# Patient Record
Sex: FEMALE | Race: WHITE
[De-identification: names, ages, dates, MRNs, and addresses within clinical notes are randomized per-mention and may not be internally consistent; named-entity substitution may affect disease eponyms.]

---

## 2023-07-24 ENCOUNTER — HOSPITAL ENCOUNTER (OUTPATIENT)
Dept: HOSPITAL 101 - LAB | Age: 68
Discharge: HOME | End: 2023-07-24
Payer: MEDICARE

## 2023-07-24 DIAGNOSIS — E11.9: ICD-10-CM

## 2023-07-24 DIAGNOSIS — I50.40: ICD-10-CM

## 2023-07-24 DIAGNOSIS — E78.00: ICD-10-CM

## 2023-07-24 DIAGNOSIS — Z79.899: Primary | ICD-10-CM

## 2023-07-24 LAB
ADD MANUAL DIFF: NO
ALANINE AMINOTRANSFERASE: 27 U/L (ref 14–59)
ALBUMIN GLOBULIN RATIO: 1.3
ALBUMIN LEVEL: 3.9 G/DL (ref 3.4–5)
ALKALINE PHOSPHATASE: 106 U/L (ref 46–116)
ANION GAP: 16.1
ASPARTATE AMINO TRANSFERASE: 19 U/L (ref 15–37)
BLOOD UREA NITROGEN: 13 MG/DL (ref 7–18)
CARBON DIOXIDE: 24.3 MMOL/L (ref 21–32)
CHLORIDE: 106 MMOL/L (ref 98–107)
CHOLESTEROL: 120 MG/DL (ref ?–200)
CO2 BLD-SCNC: 24.3 MMOL/L (ref 21–32)
ESTIMATED GFR (AFRICAN AMERICA: >60 (ref 60–?)
ESTIMATED GFR (NON-AFRICAN AME: 51 (ref 60–?)
GLOBULIN: 2.9 G/DL
GLUCOSE URINE UA: NEGATIVE MG/DL
HCT VFR BLD CALC: 40.9 % (ref 36–48)
HDL CHOLESTEROL: 63 MG/DL (ref 40–60)
HEMATOCRIT: 40.9 % (ref 36–48)
HEMOGLOBIN: 13 G/DL (ref 12–16)
IMMATURE GRANULOCYTES ABS AUTO: 0.03 10^3/UL (ref 0–0.03)
IMMATURE GRANULOCYTES PCT AUTO: 0.4 % (ref 0–0.5)
LYMPHOCYTES  ABSOLUTE AUTO: 1 10^3/UL (ref 1.2–3.8)
MCV RBC: 93.6 FL (ref 81–99)
MEAN CORPUSCULAR HEMOGLOBIN: 29.7 PG (ref 26.7–34)
MEAN CORPUSCULAR HGB CONC: 31.8 G/DL (ref 29.9–35.2)
MEAN CORPUSCULAR VOLUME: 93.6 FL (ref 81–99)
NT PRO B TYPE NATRIURETIC PEPT: 51 PG/ML (ref ?–900)
PLATELET # BLD: 252 10^3/UL (ref 150–450)
PLATELET COUNT: 252 10^3/UL (ref 150–450)
POTASSIUM SERPLBLD-SCNC: 4.4 MMOL/L (ref 3.5–5.1)
POTASSIUM: 4.4 MMOL/L (ref 3.5–5.1)
RED BLOOD COUNT: 4.37 10^6/UL (ref 4.2–5.4)
SODIUM BLD-SCNC: 142 MMOL/L (ref 136–145)
SODIUM: 142 MMOL/L (ref 136–145)
THYROID STIMULATING HORMONE: 1.78 UIU/ML (ref 0.36–3.74)
TOTAL PROTEIN: 6.8 G/DL (ref 6.4–8.2)
TRIGLYCERIDES: 119 MG/DL (ref ?–150)
URINE CULTURE INDICATED: YES
VLDL CHOLESTEROL: 23.8 MG/DL
WBC # BLD: 7.1 10^3/UL (ref 4–11)
WHITE BLOOD COUNT: 7.1 10^3/UL (ref 4–11)

## 2023-07-24 PROCEDURE — 83036 HEMOGLOBIN GLYCOSYLATED A1C: CPT

## 2023-07-24 PROCEDURE — 81015 MICROSCOPIC EXAM OF URINE: CPT

## 2023-07-24 PROCEDURE — 83880 ASSAY OF NATRIURETIC PEPTIDE: CPT

## 2023-07-24 PROCEDURE — 80051 ELECTROLYTE PANEL: CPT

## 2023-07-24 PROCEDURE — 80076 HEPATIC FUNCTION PANEL: CPT

## 2023-07-24 PROCEDURE — 85025 COMPLETE CBC W/AUTO DIFF WBC: CPT

## 2023-07-24 PROCEDURE — 82565 ASSAY OF CREATININE: CPT

## 2023-07-24 PROCEDURE — 81003 URINALYSIS AUTO W/O SCOPE: CPT

## 2023-07-24 PROCEDURE — 87086 URINE CULTURE/COLONY COUNT: CPT

## 2023-07-24 PROCEDURE — 80061 LIPID PANEL: CPT

## 2023-07-24 PROCEDURE — 84443 ASSAY THYROID STIM HORMONE: CPT

## 2023-07-24 PROCEDURE — 36415 COLL VENOUS BLD VENIPUNCTURE: CPT

## 2023-07-24 PROCEDURE — 84520 ASSAY OF UREA NITROGEN: CPT

## 2023-08-07 ENCOUNTER — HOSPITAL ENCOUNTER (OUTPATIENT)
Dept: HOSPITAL 101 - LAB | Age: 68
Discharge: HOME | End: 2023-08-07
Payer: MEDICARE

## 2023-08-07 DIAGNOSIS — F32.A: ICD-10-CM

## 2023-08-07 DIAGNOSIS — E11.9: Primary | ICD-10-CM

## 2023-08-07 DIAGNOSIS — J45.909: ICD-10-CM

## 2023-08-07 DIAGNOSIS — I10: ICD-10-CM

## 2023-08-07 LAB
ADD MANUAL DIFF: NO
ALANINE AMINOTRANSFERASE: 26 U/L (ref 14–59)
ALBUMIN GLOBULIN RATIO: 1.3
ALBUMIN LEVEL: 4 G/DL (ref 3.4–5)
ALKALINE PHOSPHATASE: 117 U/L (ref 46–116)
ANION GAP: 14
ASPARTATE AMINO TRANSFERASE: 13 U/L (ref 15–37)
BLOOD UREA NITROGEN: 13 MG/DL (ref 7–18)
CARBON DIOXIDE: 27.9 MMOL/L (ref 21–32)
CHLORIDE: 102 MMOL/L (ref 98–107)
CHOLESTEROL: 123 MG/DL (ref ?–200)
CO2 BLD-SCNC: 27.9 MMOL/L (ref 21–32)
ESTIMATED GFR (AFRICAN AMERICA: >60 (ref 60–?)
ESTIMATED GFR (NON-AFRICAN AME: >60 (ref 60–?)
GLOBULIN: 3.2 G/DL
GLUCOSE BLD-MCNC: 136 MG/DL (ref 74–106)
HCT VFR BLD CALC: 42.5 % (ref 36–48)
HDL CHOLESTEROL: 66 MG/DL (ref 40–60)
HEMATOCRIT: 42.5 % (ref 36–48)
HEMOGLOBIN: 13.5 G/DL (ref 12–16)
IMMATURE GRANULOCYTES ABS AUTO: 0.05 10^3/UL (ref 0–0.03)
IMMATURE GRANULOCYTES PCT AUTO: 0.4 % (ref 0–0.5)
LYMPHOCYTES  ABSOLUTE AUTO: 1.1 10^3/UL (ref 1.2–3.8)
MCV RBC: 94.2 FL (ref 81–99)
MEAN CORPUSCULAR HEMOGLOBIN: 29.9 PG (ref 26.7–34)
MEAN CORPUSCULAR HGB CONC: 31.8 G/DL (ref 29.9–35.2)
MEAN CORPUSCULAR VOLUME: 94.2 FL (ref 81–99)
NT PRO B TYPE NATRIURETIC PEPT: 39 PG/ML (ref ?–900)
PLATELET # BLD: 236 10^3/UL (ref 150–450)
PLATELET COUNT: 236 10^3/UL (ref 150–450)
POTASSIUM SERPLBLD-SCNC: 3.9 MMOL/L (ref 3.5–5.1)
POTASSIUM: 3.9 MMOL/L (ref 3.5–5.1)
RED BLOOD COUNT: 4.51 10^6/UL (ref 4.2–5.4)
SODIUM BLD-SCNC: 140 MMOL/L (ref 136–145)
SODIUM: 140 MMOL/L (ref 136–145)
THYROID STIMULATING HORMONE: 1.49 UIU/ML (ref 0.36–3.74)
TOTAL PROTEIN: 7.2 G/DL (ref 6.4–8.2)
TRIGLYCERIDES: 101 MG/DL (ref ?–150)
VLDL CHOLESTEROL: 20.2 MG/DL
WBC # BLD: 11.2 10^3/UL (ref 4–11)
WHITE BLOOD COUNT: 11.2 10^3/UL (ref 4–11)

## 2023-08-07 PROCEDURE — 80076 HEPATIC FUNCTION PANEL: CPT

## 2023-08-07 PROCEDURE — 83036 HEMOGLOBIN GLYCOSYLATED A1C: CPT

## 2023-08-07 PROCEDURE — 80051 ELECTROLYTE PANEL: CPT

## 2023-08-07 PROCEDURE — 82947 ASSAY GLUCOSE BLOOD QUANT: CPT

## 2023-08-07 PROCEDURE — 82565 ASSAY OF CREATININE: CPT

## 2023-08-07 PROCEDURE — 84443 ASSAY THYROID STIM HORMONE: CPT

## 2023-08-07 PROCEDURE — 84520 ASSAY OF UREA NITROGEN: CPT

## 2023-08-07 PROCEDURE — 36415 COLL VENOUS BLD VENIPUNCTURE: CPT

## 2023-08-07 PROCEDURE — 82306 VITAMIN D 25 HYDROXY: CPT

## 2023-08-07 PROCEDURE — 85025 COMPLETE CBC W/AUTO DIFF WBC: CPT

## 2023-08-07 PROCEDURE — 80061 LIPID PANEL: CPT

## 2023-08-07 PROCEDURE — 83880 ASSAY OF NATRIURETIC PEPTIDE: CPT

## 2023-10-23 ENCOUNTER — HOSPITAL ENCOUNTER (OUTPATIENT)
Dept: HOSPITAL 101 - LAB | Age: 68
Discharge: HOME | End: 2023-10-23
Payer: MEDICARE

## 2023-10-23 DIAGNOSIS — E11.9: Primary | ICD-10-CM

## 2023-10-23 LAB
ALANINE AMINOTRANSFERASE: 27 U/L (ref 14–59)
ALBUMIN GLOBULIN RATIO: 1.2
ALBUMIN LEVEL: 3.9 G/DL (ref 3.4–5)
ALKALINE PHOSPHATASE: 125 U/L (ref 46–116)
ANION GAP: 11.9
ASPARTATE AMINO TRANSFERASE: 15 U/L (ref 15–37)
BLOOD UREA NITROGEN: 15 MG/DL (ref 7–18)
CALCIUM: 9.1 MG/DL (ref 8.5–10.1)
CARBON DIOXIDE: 28.9 MMOL/L (ref 21–32)
CHLORIDE: 105 MMOL/L (ref 98–107)
CHOLESTEROL: 129 MG/DL (ref ?–200)
CO2 BLD-SCNC: 28.9 MMOL/L (ref 21–32)
ESTIMATED GFR (AFRICAN AMERICA: >60 (ref 60–?)
ESTIMATED GFR (NON-AFRICAN AME: 59 (ref 60–?)
GLOBULIN: 3.2 G/DL
GLUCOSE BLD-MCNC: 90 MG/DL (ref 74–106)
HDL CHOLESTEROL: 71 MG/DL (ref 40–60)
POTASSIUM SERPLBLD-SCNC: 4.8 MMOL/L (ref 3.5–5.1)
POTASSIUM: 4.8 MMOL/L (ref 3.5–5.1)
SODIUM BLD-SCNC: 141 MMOL/L (ref 136–145)
SODIUM: 141 MMOL/L (ref 136–145)
TOTAL PROTEIN: 7.1 G/DL (ref 6.4–8.2)
TRIGLYCERIDES: 85 MG/DL (ref ?–150)
VLDL CHOLESTEROL: 17 MG/DL

## 2023-10-23 PROCEDURE — 80061 LIPID PANEL: CPT

## 2023-10-23 PROCEDURE — 83036 HEMOGLOBIN GLYCOSYLATED A1C: CPT

## 2023-10-23 PROCEDURE — 80048 BASIC METABOLIC PNL TOTAL CA: CPT

## 2023-10-23 PROCEDURE — 80076 HEPATIC FUNCTION PANEL: CPT

## 2023-10-23 PROCEDURE — 36415 COLL VENOUS BLD VENIPUNCTURE: CPT

## 2023-11-08 ENCOUNTER — HOSPITAL ENCOUNTER
Dept: HOSPITAL 101 - MAMMO | Age: 68
Discharge: HOME | End: 2023-11-08
Payer: MEDICARE

## 2023-11-08 DIAGNOSIS — Z12.31: Primary | ICD-10-CM

## 2023-11-08 PROCEDURE — 77067 SCR MAMMO BI INCL CAD: CPT

## 2023-11-08 PROCEDURE — 77063 BREAST TOMOSYNTHESIS BI: CPT

## 2023-11-15 ENCOUNTER — HOSPITAL ENCOUNTER (OUTPATIENT)
Dept: HOSPITAL 101 - LAB | Age: 68
Discharge: HOME | End: 2023-11-15
Payer: MEDICARE

## 2023-11-15 DIAGNOSIS — Z01.419: Primary | ICD-10-CM

## 2023-11-15 PROCEDURE — G0145 SCR C/V CYTO,THINLAYER,RESCR: HCPCS

## 2023-11-27 ENCOUNTER — HOSPITAL ENCOUNTER
Dept: HOSPITAL 101 - LAB | Age: 68
Discharge: HOME | End: 2023-11-27
Payer: MEDICARE

## 2023-11-27 DIAGNOSIS — K57.90: ICD-10-CM

## 2023-11-27 DIAGNOSIS — K62.5: Primary | ICD-10-CM

## 2023-11-27 LAB
ESTIMATED GFR (AFRICAN AMERICA: 57 (ref 60–?)
ESTIMATED GFR (NON-AFRICAN AME: 47 (ref 60–?)

## 2023-11-27 PROCEDURE — 82565 ASSAY OF CREATININE: CPT

## 2023-11-27 PROCEDURE — 36415 COLL VENOUS BLD VENIPUNCTURE: CPT

## 2023-11-27 PROCEDURE — 74178 CT ABD&PLV WO CNTR FLWD CNTR: CPT

## 2023-11-30 ENCOUNTER — HOSPITAL ENCOUNTER
Dept: HOSPITAL 101 - US | Age: 68
Discharge: HOME | End: 2023-11-30
Payer: MEDICARE

## 2023-11-30 DIAGNOSIS — Q51.28: ICD-10-CM

## 2023-11-30 DIAGNOSIS — N85.00: ICD-10-CM

## 2023-11-30 DIAGNOSIS — N95.0: Primary | ICD-10-CM

## 2023-11-30 PROCEDURE — 76830 TRANSVAGINAL US NON-OB: CPT

## 2023-11-30 PROCEDURE — 76856 US EXAM PELVIC COMPLETE: CPT

## 2023-12-26 ENCOUNTER — HOSPITAL ENCOUNTER (OUTPATIENT)
Dept: HOSPITAL 101 - LAB | Age: 68
Discharge: HOME | End: 2023-12-26
Payer: MEDICARE

## 2023-12-26 DIAGNOSIS — R87.612: Primary | ICD-10-CM

## 2023-12-26 PROCEDURE — 88342 IMHCHEM/IMCYTCHM 1ST ANTB: CPT

## 2023-12-26 PROCEDURE — 88305 TISSUE EXAM BY PATHOLOGIST: CPT

## 2023-12-26 PROCEDURE — 88341 IMHCHEM/IMCYTCHM EA ADD ANTB: CPT

## 2024-02-05 ENCOUNTER — HOSPITAL ENCOUNTER
Dept: HOSPITAL 101 - PST | Age: 69
Discharge: HOME | End: 2024-02-05
Payer: MEDICARE

## 2024-02-05 DIAGNOSIS — Z01.812: Primary | ICD-10-CM

## 2024-02-05 DIAGNOSIS — R93.89: ICD-10-CM

## 2024-02-05 DIAGNOSIS — J44.9: ICD-10-CM

## 2024-02-05 DIAGNOSIS — R87.613: ICD-10-CM

## 2024-02-05 DIAGNOSIS — Z01.810: ICD-10-CM

## 2024-02-05 DIAGNOSIS — N95.0: ICD-10-CM

## 2024-02-05 DIAGNOSIS — I10: ICD-10-CM

## 2024-02-05 DIAGNOSIS — Z79.01: ICD-10-CM

## 2024-02-05 LAB
ADD MANUAL DIFF: NO
ANION GAP: 13.9
BLOOD UREA NITROGEN: 18 MG/DL (ref 7–18)
CALCIUM: 9.1 MG/DL (ref 8.5–10.1)
CARBON DIOXIDE: 26.5 MMOL/L (ref 21–32)
CHLORIDE: 106 MMOL/L (ref 98–107)
CO2 BLD-SCNC: 26.5 MMOL/L (ref 21–32)
ESTIMATED GFR (AFRICAN AMERICA: >60 (ref 60–?)
ESTIMATED GFR (NON-AFRICAN AME: 50 (ref 60–?)
GLUCOSE BLD-MCNC: 92 MG/DL (ref 74–106)
HCT VFR BLD CALC: 34.1 % (ref 36–48)
HEMATOCRIT: 34.1 % (ref 36–48)
HEMOGLOBIN: 10.7 G/DL (ref 12–16)
IMMATURE GRANULOCYTES ABS AUTO: 0.03 10^3/UL (ref 0–0.03)
IMMATURE GRANULOCYTES PCT AUTO: 0.3 % (ref 0–0.5)
INR: 0.95
LYMPHOCYTES  ABSOLUTE AUTO: 1.4 10^3/UL (ref 1.2–3.8)
MCV RBC: 94.7 FL (ref 81–99)
MEAN CORPUSCULAR HEMOGLOBIN: 29.7 PG (ref 26.7–34)
MEAN CORPUSCULAR HGB CONC: 31.4 G/DL (ref 29.9–35.2)
MEAN CORPUSCULAR VOLUME: 94.7 FL (ref 81–99)
PARTIAL THROMBOPLASTIN TIME: 27.5 SEC (ref 22.3–36.2)
PLATELET # BLD: 385 10^3/UL (ref 150–450)
PLATELET COUNT: 385 10^3/UL (ref 150–450)
POTASSIUM SERPLBLD-SCNC: 4.4 MMOL/L (ref 3.5–5.1)
POTASSIUM: 4.4 MMOL/L (ref 3.5–5.1)
PROTHROMBIN TIME: 10.1 SEC (ref 9–11.6)
RED BLOOD COUNT: 3.6 10^6/UL (ref 4.2–5.4)
SODIUM BLD-SCNC: 142 MMOL/L (ref 136–145)
SODIUM: 142 MMOL/L (ref 136–145)
WBC # BLD: 9 10^3/UL (ref 4–11)
WHITE BLOOD COUNT: 9 10^3/UL (ref 4–11)

## 2024-02-05 PROCEDURE — 85610 PROTHROMBIN TIME: CPT

## 2024-02-05 PROCEDURE — 85730 THROMBOPLASTIN TIME PARTIAL: CPT

## 2024-02-05 PROCEDURE — 93005 ELECTROCARDIOGRAM TRACING: CPT

## 2024-02-05 PROCEDURE — 85025 COMPLETE CBC W/AUTO DIFF WBC: CPT

## 2024-02-05 PROCEDURE — 71046 X-RAY EXAM CHEST 2 VIEWS: CPT

## 2024-02-05 PROCEDURE — 80048 BASIC METABOLIC PNL TOTAL CA: CPT

## 2024-02-07 ENCOUNTER — HOSPITAL ENCOUNTER (OUTPATIENT)
Dept: HOSPITAL 101 - LAB | Age: 69
Discharge: HOME | End: 2024-02-07
Payer: MEDICARE

## 2024-02-07 DIAGNOSIS — E11.8: Primary | ICD-10-CM

## 2024-02-07 DIAGNOSIS — E55.9: ICD-10-CM

## 2024-02-07 DIAGNOSIS — R06.02: ICD-10-CM

## 2024-02-07 LAB
ADD MANUAL DIFF: NO
ANION GAP: 13.3
BLOOD UREA NITROGEN: 17 MG/DL (ref 7–18)
CALCIUM: 9.3 MG/DL (ref 8.5–10.1)
CARBON DIOXIDE: 27.2 MMOL/L (ref 21–32)
CHLORIDE: 104 MMOL/L (ref 98–107)
CHOLESTEROL: 152 MG/DL (ref ?–200)
CO2 BLD-SCNC: 27.2 MMOL/L (ref 21–32)
ESTIMATED GFR (AFRICAN AMERICA: 59 (ref 60–?)
ESTIMATED GFR (NON-AFRICAN AME: 49 (ref 60–?)
HCT VFR BLD CALC: 37.1 % (ref 36–48)
HDL CHOLESTEROL: 69 MG/DL (ref 40–60)
HEMATOCRIT: 37.1 % (ref 36–48)
HEMOGLOBIN: 11.4 G/DL (ref 12–16)
IMMATURE GRANULOCYTES ABS AUTO: 0.03 10^3/UL (ref 0–0.03)
IMMATURE GRANULOCYTES PCT AUTO: 0.4 % (ref 0–0.5)
LYMPHOCYTES  ABSOLUTE AUTO: 1.3 10^3/UL (ref 1.2–3.8)
MCV RBC: 96.9 FL (ref 81–99)
MEAN CORPUSCULAR HEMOGLOBIN: 29.8 PG (ref 26.7–34)
MEAN CORPUSCULAR HGB CONC: 30.7 G/DL (ref 29.9–35.2)
MEAN CORPUSCULAR VOLUME: 96.9 FL (ref 81–99)
NT PRO B TYPE NATRIURETIC PEPT: 63 PG/ML (ref ?–900)
PLATELET # BLD: 392 10^3/UL (ref 150–450)
PLATELET COUNT: 392 10^3/UL (ref 150–450)
POTASSIUM SERPLBLD-SCNC: 4.5 MMOL/L (ref 3.5–5.1)
POTASSIUM: 4.5 MMOL/L (ref 3.5–5.1)
RED BLOOD COUNT: 3.83 10^6/UL (ref 4.2–5.4)
SODIUM BLD-SCNC: 140 MMOL/L (ref 136–145)
SODIUM: 140 MMOL/L (ref 136–145)
THYROID STIMULATING HORMONE: 1.79 UIU/ML (ref 0.36–3.74)
TRIGLYCERIDES: 120 MG/DL (ref ?–150)
VLDL CHOLESTEROL: 24 MG/DL
WBC # BLD: 7.8 10^3/UL (ref 4–11)
WHITE BLOOD COUNT: 7.8 10^3/UL (ref 4–11)

## 2024-02-07 PROCEDURE — 85025 COMPLETE CBC W/AUTO DIFF WBC: CPT

## 2024-02-07 PROCEDURE — 80061 LIPID PANEL: CPT

## 2024-02-07 PROCEDURE — 84520 ASSAY OF UREA NITROGEN: CPT

## 2024-02-07 PROCEDURE — 82306 VITAMIN D 25 HYDROXY: CPT

## 2024-02-07 PROCEDURE — 80051 ELECTROLYTE PANEL: CPT

## 2024-02-07 PROCEDURE — 82310 ASSAY OF CALCIUM: CPT

## 2024-02-07 PROCEDURE — 82565 ASSAY OF CREATININE: CPT

## 2024-02-07 PROCEDURE — 36415 COLL VENOUS BLD VENIPUNCTURE: CPT

## 2024-02-07 PROCEDURE — 84443 ASSAY THYROID STIM HORMONE: CPT

## 2024-02-07 PROCEDURE — 83036 HEMOGLOBIN GLYCOSYLATED A1C: CPT

## 2024-02-07 PROCEDURE — 83880 ASSAY OF NATRIURETIC PEPTIDE: CPT

## 2024-02-07 PROCEDURE — 85652 RBC SED RATE AUTOMATED: CPT

## 2024-03-01 ENCOUNTER — HOSPITAL ENCOUNTER
Dept: HOSPITAL 101 - NM | Age: 69
Discharge: HOME | End: 2024-03-01
Payer: MEDICARE

## 2024-03-01 DIAGNOSIS — Z01.810: Primary | ICD-10-CM

## 2024-03-01 PROCEDURE — A9500 TC99M SESTAMIBI: HCPCS

## 2024-03-01 PROCEDURE — 78452 HT MUSCLE IMAGE SPECT MULT: CPT

## 2024-03-01 PROCEDURE — 93306 TTE W/DOPPLER COMPLETE: CPT

## 2024-03-01 PROCEDURE — 93017 CV STRESS TEST TRACING ONLY: CPT

## 2024-03-01 RX ADMIN — REGADENOSON 0.4 MG: 0.08 INJECTION, SOLUTION INTRAVENOUS at 09:49

## 2024-03-15 ENCOUNTER — HOSPITAL ENCOUNTER (OUTPATIENT)
Dept: HOSPITAL 101 - LAB | Age: 69
Discharge: HOME | End: 2024-03-15
Payer: MEDICARE

## 2024-03-15 DIAGNOSIS — D64.9: Primary | ICD-10-CM

## 2024-03-15 LAB
ADD MANUAL DIFF: NO
ALANINE AMINOTRANSFERASE: 25 U/L (ref 14–59)
ALBUMIN GLOBULIN RATIO: 1.2
ALBUMIN LEVEL: 3.7 G/DL (ref 3.4–5)
ALKALINE PHOSPHATASE: 120 U/L (ref 46–116)
ANION GAP: 15.6
ASPARTATE AMINO TRANSFERASE: 16 U/L (ref 15–37)
BLOOD UREA NITROGEN: 12 MG/DL (ref 7–18)
CALCIUM: 9 MG/DL (ref 8.5–10.1)
CARBON DIOXIDE: 24.6 MMOL/L (ref 21–32)
CHLORIDE: 102 MMOL/L (ref 98–107)
ESTIMATED GFR (AFRICAN AMERICA: 44 (ref 60–?)
ESTIMATED GFR (NON-AFRICAN AME: 36 (ref 60–?)
GLOBULIN: 3.2 G/DL
GLUCOSE: 124 MG/DL (ref 74–106)
HEMATOCRIT: 30.2 % (ref 36–48)
HEMOGLOBIN: 9 G/DL (ref 12–16)
IMMATURE GRANULOCYTES ABS AUTO: 0.04 10^3/UL (ref 0–0.03)
IMMATURE GRANULOCYTES PCT AUTO: 0.4 % (ref 0–0.5)
LYMPHOCYTES  ABSOLUTE AUTO: 1.4 10^3/UL (ref 1.2–3.8)
MCV RBC: 100 FL (ref 81–99)
MEAN CORPUSCULAR HEMOGLOBIN: 29.8 PG (ref 26.7–34)
MEAN CORPUSCULAR HGB CONC: 29.8 G/DL (ref 29.9–35.2)
PLATELET COUNT: 395 10^3/UL (ref 150–450)
POTASSIUM: 4.2 MMOL/L (ref 3.5–5.1)
RED BLOOD COUNT: 3.02 10^6/UL (ref 4.2–5.4)
SODIUM: 138 MMOL/L (ref 136–145)
TOTAL PROTEIN: 6.9 G/DL (ref 6.4–8.2)
WHITE BLOOD COUNT: 10.8 10^3/UL (ref 4–11)

## 2024-03-15 PROCEDURE — 85025 COMPLETE CBC W/AUTO DIFF WBC: CPT

## 2024-03-15 PROCEDURE — 80053 COMPREHEN METABOLIC PANEL: CPT

## 2024-03-15 PROCEDURE — 36415 COLL VENOUS BLD VENIPUNCTURE: CPT

## 2024-03-22 ENCOUNTER — HOSPITAL ENCOUNTER (OUTPATIENT)
Dept: HOSPITAL 101 - LAB | Age: 69
Discharge: HOME | End: 2024-03-22
Payer: MEDICARE

## 2024-03-22 DIAGNOSIS — I10: Primary | ICD-10-CM

## 2024-03-22 LAB
ANION GAP: 17.1
BLOOD UREA NITROGEN: 16 MG/DL (ref 7–18)
CALCIUM: 9.3 MG/DL (ref 8.5–10.1)
CARBON DIOXIDE: 26.5 MMOL/L (ref 21–32)
CHLORIDE: 98 MMOL/L (ref 98–107)
ESTIMATED GFR (AFRICAN AMERICA: 36 (ref 60–?)
ESTIMATED GFR (NON-AFRICAN AME: 30 (ref 60–?)
GLUCOSE: 97 MG/DL (ref 74–106)
POTASSIUM: 4.6 MMOL/L (ref 3.5–5.1)
SODIUM: 137 MMOL/L (ref 136–145)

## 2024-03-22 PROCEDURE — 80048 BASIC METABOLIC PNL TOTAL CA: CPT

## 2024-03-22 PROCEDURE — 36415 COLL VENOUS BLD VENIPUNCTURE: CPT

## 2024-04-05 ENCOUNTER — HOSPITAL ENCOUNTER (OUTPATIENT)
Dept: HOSPITAL 101 - LAB | Age: 69
Discharge: HOME | End: 2024-04-05
Payer: MEDICARE

## 2024-04-05 DIAGNOSIS — R94.4: Primary | ICD-10-CM

## 2024-04-05 DIAGNOSIS — I10: ICD-10-CM

## 2024-04-05 LAB
ANION GAP: 15.1
BLOOD UREA NITROGEN: 10 MG/DL (ref 7–18)
CALCIUM: 8.9 MG/DL (ref 8.5–10.1)
CARBON DIOXIDE: 23 MMOL/L (ref 21–32)
CHLORIDE: 104 MMOL/L (ref 98–107)
ESTIMATED GFR (AFRICAN AMERICA: 45 (ref 60–?)
ESTIMATED GFR (NON-AFRICAN AME: 37 (ref 60–?)
GLUCOSE: 143 MG/DL (ref 74–106)
POTASSIUM: 4.1 MMOL/L (ref 3.5–5.1)
SODIUM: 138 MMOL/L (ref 136–145)

## 2024-04-05 PROCEDURE — 36415 COLL VENOUS BLD VENIPUNCTURE: CPT

## 2024-04-05 PROCEDURE — 80048 BASIC METABOLIC PNL TOTAL CA: CPT

## 2024-04-26 ENCOUNTER — HOSPITAL ENCOUNTER (OUTPATIENT)
Dept: HOSPITAL 101 - LAB | Age: 69
Discharge: HOME | End: 2024-04-26
Payer: MEDICARE

## 2024-04-26 ENCOUNTER — HOSPITAL ENCOUNTER
Age: 69
Discharge: HOME | End: 2024-04-26
Payer: MEDICARE

## 2024-04-26 DIAGNOSIS — R06.02: ICD-10-CM

## 2024-04-26 DIAGNOSIS — Z01.818: Primary | ICD-10-CM

## 2024-04-26 DIAGNOSIS — Z79.01: ICD-10-CM

## 2024-04-26 DIAGNOSIS — R93.89: ICD-10-CM

## 2024-04-26 DIAGNOSIS — J44.9: ICD-10-CM

## 2024-04-26 DIAGNOSIS — I10: ICD-10-CM

## 2024-04-26 DIAGNOSIS — R87.613: ICD-10-CM

## 2024-04-26 DIAGNOSIS — Z01.812: Primary | ICD-10-CM

## 2024-04-26 DIAGNOSIS — E11.9: ICD-10-CM

## 2024-04-26 DIAGNOSIS — R06.09: ICD-10-CM

## 2024-04-26 DIAGNOSIS — K76.9: ICD-10-CM

## 2024-04-26 LAB
ADD MANUAL DIFF: NO
ALANINE AMINOTRANSFERASE: 21 U/L (ref 14–59)
ALBUMIN GLOBULIN RATIO: 1.1
ALBUMIN LEVEL: 3.4 G/DL (ref 3.4–5)
ALKALINE PHOSPHATASE: 103 U/L (ref 46–116)
ANION GAP: 17.6
ASPARTATE AMINO TRANSFERASE: 16 U/L (ref 15–37)
BLOOD UREA NITROGEN: 13 MG/DL (ref 7–18)
CALCIUM: 9.5 MG/DL (ref 8.5–10.1)
CARBON DIOXIDE: 23 MMOL/L (ref 21–32)
CHLORIDE: 101 MMOL/L (ref 98–107)
CHOLESTEROL: 107 MG/DL (ref ?–200)
ESTIMATED GFR (AFRICAN AMERICA: 52 (ref 60–?)
ESTIMATED GFR (NON-AFRICAN AME: 43 (ref 60–?)
GLOBULIN: 3.2 G/DL
GLUCOSE: 116 MG/DL (ref 74–106)
HDL CHOLESTEROL: 62 MG/DL (ref 40–60)
HEMATOCRIT: 25.4 % (ref 36–48)
HEMOGLOBIN: 7.2 G/DL (ref 12–16)
IMMATURE GRANULOCYTES ABS AUTO: 0.04 10^3/UL (ref 0–0.03)
IMMATURE GRANULOCYTES PCT AUTO: 0.4 % (ref 0–0.5)
LYMPHOCYTES  ABSOLUTE AUTO: 1.7 10^3/UL (ref 1.2–3.8)
MCV RBC: 94.8 FL (ref 81–99)
MEAN CORPUSCULAR HEMOGLOBIN: 26.9 PG (ref 26.7–34)
MEAN CORPUSCULAR HGB CONC: 28.3 G/DL (ref 29.9–35.2)
PLATELET COUNT: 596 10^3/UL (ref 150–450)
POTASSIUM: 4.6 MMOL/L (ref 3.5–5.1)
RED BLOOD COUNT: 2.68 10^6/UL (ref 4.2–5.4)
SODIUM: 137 MMOL/L (ref 136–145)
THYROID STIMULATING HORMONE: 1.95 UIU/ML (ref 0.36–3.74)
TOTAL PROTEIN: 6.6 G/DL (ref 6.4–8.2)
TRIGLYCERIDES: 104 MG/DL (ref ?–150)
VLDL CHOLESTEROL: 20.8 MG/DL
WHITE BLOOD COUNT: 10.6 10^3/UL (ref 4–11)

## 2024-04-26 PROCEDURE — 80048 BASIC METABOLIC PNL TOTAL CA: CPT

## 2024-04-26 PROCEDURE — 85025 COMPLETE CBC W/AUTO DIFF WBC: CPT

## 2024-04-26 PROCEDURE — 85610 PROTHROMBIN TIME: CPT

## 2024-04-26 PROCEDURE — 80061 LIPID PANEL: CPT

## 2024-04-26 PROCEDURE — 36415 COLL VENOUS BLD VENIPUNCTURE: CPT

## 2024-04-26 PROCEDURE — G0463 HOSPITAL OUTPT CLINIC VISIT: HCPCS

## 2024-04-26 PROCEDURE — 80076 HEPATIC FUNCTION PANEL: CPT

## 2024-04-26 PROCEDURE — 85730 THROMBOPLASTIN TIME PARTIAL: CPT

## 2024-04-26 PROCEDURE — 83036 HEMOGLOBIN GLYCOSYLATED A1C: CPT

## 2024-04-26 PROCEDURE — 84443 ASSAY THYROID STIM HORMONE: CPT

## 2024-04-30 ENCOUNTER — HOSPITAL ENCOUNTER (INPATIENT)
Dept: HOSPITAL 101 - ER | Age: 69
LOS: 2 days | Discharge: ANOTHER HOSPITAL | DRG: 760 | End: 2024-05-02
Payer: MEDICARE

## 2024-04-30 VITALS — SYSTOLIC BLOOD PRESSURE: 104 MMHG | OXYGEN SATURATION: 99 % | DIASTOLIC BLOOD PRESSURE: 75 MMHG | HEART RATE: 75 BPM

## 2024-04-30 VITALS — HEART RATE: 74 BPM | OXYGEN SATURATION: 99 %

## 2024-04-30 VITALS — HEART RATE: 69 BPM | SYSTOLIC BLOOD PRESSURE: 107 MMHG | DIASTOLIC BLOOD PRESSURE: 49 MMHG | OXYGEN SATURATION: 98 %

## 2024-04-30 VITALS
HEART RATE: 67 BPM | DIASTOLIC BLOOD PRESSURE: 55 MMHG | TEMPERATURE: 98.06 F | SYSTOLIC BLOOD PRESSURE: 93 MMHG | OXYGEN SATURATION: 98 %

## 2024-04-30 VITALS — OXYGEN SATURATION: 97 % | HEART RATE: 72 BPM

## 2024-04-30 VITALS
OXYGEN SATURATION: 99 % | TEMPERATURE: 97.6 F | HEART RATE: 71 BPM | DIASTOLIC BLOOD PRESSURE: 57 MMHG | SYSTOLIC BLOOD PRESSURE: 107 MMHG

## 2024-04-30 VITALS — OXYGEN SATURATION: 98 % | HEART RATE: 72 BPM

## 2024-04-30 VITALS — HEART RATE: 72 BPM | OXYGEN SATURATION: 98 %

## 2024-04-30 VITALS — OXYGEN SATURATION: 98 % | HEART RATE: 74 BPM

## 2024-04-30 VITALS — OXYGEN SATURATION: 99 % | HEART RATE: 72 BPM

## 2024-04-30 VITALS
OXYGEN SATURATION: 97 % | DIASTOLIC BLOOD PRESSURE: 58 MMHG | TEMPERATURE: 98.3 F | HEART RATE: 78 BPM | SYSTOLIC BLOOD PRESSURE: 127 MMHG

## 2024-04-30 VITALS
DIASTOLIC BLOOD PRESSURE: 55 MMHG | SYSTOLIC BLOOD PRESSURE: 93 MMHG | HEART RATE: 65 BPM | TEMPERATURE: 98 F | OXYGEN SATURATION: 98 %

## 2024-04-30 VITALS
SYSTOLIC BLOOD PRESSURE: 103 MMHG | HEART RATE: 70 BPM | TEMPERATURE: 97.88 F | OXYGEN SATURATION: 96 % | DIASTOLIC BLOOD PRESSURE: 64 MMHG

## 2024-04-30 VITALS — OXYGEN SATURATION: 97 % | HEART RATE: 72 BPM | SYSTOLIC BLOOD PRESSURE: 85 MMHG | DIASTOLIC BLOOD PRESSURE: 48 MMHG

## 2024-04-30 VITALS — HEART RATE: 71 BPM | DIASTOLIC BLOOD PRESSURE: 46 MMHG | SYSTOLIC BLOOD PRESSURE: 88 MMHG

## 2024-04-30 VITALS — HEART RATE: 63 BPM

## 2024-04-30 VITALS — OXYGEN SATURATION: 98 % | HEART RATE: 70 BPM

## 2024-04-30 VITALS — OXYGEN SATURATION: 98 % | HEART RATE: 76 BPM

## 2024-04-30 VITALS — SYSTOLIC BLOOD PRESSURE: 99 MMHG | HEART RATE: 72 BPM | DIASTOLIC BLOOD PRESSURE: 65 MMHG

## 2024-04-30 VITALS — BODY MASS INDEX: 1 KG/M2 | BODY MASS INDEX: 36.6 KG/M2 | BODY MASS INDEX: 32.7 KG/M2

## 2024-04-30 VITALS — OXYGEN SATURATION: 99 %

## 2024-04-30 VITALS — HEART RATE: 72 BPM

## 2024-04-30 DIAGNOSIS — R19.5: ICD-10-CM

## 2024-04-30 DIAGNOSIS — N89.9: Primary | ICD-10-CM

## 2024-04-30 DIAGNOSIS — N95.0: ICD-10-CM

## 2024-04-30 DIAGNOSIS — J44.9: ICD-10-CM

## 2024-04-30 DIAGNOSIS — I25.10: ICD-10-CM

## 2024-04-30 DIAGNOSIS — E11.9: ICD-10-CM

## 2024-04-30 DIAGNOSIS — R47.9: ICD-10-CM

## 2024-04-30 DIAGNOSIS — N13.1: ICD-10-CM

## 2024-04-30 DIAGNOSIS — Z98.891: ICD-10-CM

## 2024-04-30 DIAGNOSIS — F99: ICD-10-CM

## 2024-04-30 DIAGNOSIS — Z79.899: ICD-10-CM

## 2024-04-30 DIAGNOSIS — R31.0: ICD-10-CM

## 2024-04-30 DIAGNOSIS — F20.9: ICD-10-CM

## 2024-04-30 DIAGNOSIS — R93.89: ICD-10-CM

## 2024-04-30 DIAGNOSIS — Q51.28: ICD-10-CM

## 2024-04-30 DIAGNOSIS — R87.613: ICD-10-CM

## 2024-04-30 DIAGNOSIS — N93.9: ICD-10-CM

## 2024-04-30 DIAGNOSIS — I11.0: ICD-10-CM

## 2024-04-30 DIAGNOSIS — Z79.85: ICD-10-CM

## 2024-04-30 DIAGNOSIS — E78.00: ICD-10-CM

## 2024-04-30 DIAGNOSIS — Z90.49: ICD-10-CM

## 2024-04-30 DIAGNOSIS — H91.90: ICD-10-CM

## 2024-04-30 DIAGNOSIS — Z79.84: ICD-10-CM

## 2024-04-30 DIAGNOSIS — I50.32: ICD-10-CM

## 2024-04-30 DIAGNOSIS — D62: ICD-10-CM

## 2024-04-30 DIAGNOSIS — Z79.82: ICD-10-CM

## 2024-04-30 LAB
ADD MANUAL DIFF: NO
ANION GAP: 14.2
BLOOD UREA NITROGEN: 13 MG/DL (ref 7–18)
CALCIUM: 8.9 MG/DL (ref 8.5–10.1)
CARBON DIOXIDE: 23 MMOL/L (ref 21–32)
CHLORIDE: 104 MMOL/L (ref 98–107)
ESTIMATED GFR (AFRICAN AMERICA: 45 (ref 60–?)
ESTIMATED GFR (NON-AFRICAN AME: 37 (ref 60–?)
GLUCOSE: 103 MG/DL (ref 74–106)
HEMATOCRIT: 22.9 % (ref 36–48)
HEMOGLOBIN: 6.5 G/DL (ref 12–16)
IMMATURE GRANULOCYTES ABS AUTO: 0.05 10^3/UL (ref 0–0.03)
IMMATURE GRANULOCYTES PCT AUTO: 0.7 % (ref 0–0.5)
LYMPHOCYTES  ABSOLUTE AUTO: 1 10^3/UL (ref 1.2–3.8)
MCV RBC: 94.2 FL (ref 81–99)
MEAN CORPUSCULAR HEMOGLOBIN: 26.7 PG (ref 26.7–34)
MEAN CORPUSCULAR HGB CONC: 28.4 G/DL (ref 29.9–35.2)
PLATELET COUNT: 420 10^3/UL (ref 150–450)
POTASSIUM: 4.2 MMOL/L (ref 3.5–5.1)
RED BLOOD COUNT: 2.43 10^6/UL (ref 4.2–5.4)
SODIUM: 137 MMOL/L (ref 136–145)
WHITE BLOOD COUNT: 7.2 10^3/UL (ref 4–11)

## 2024-04-30 PROCEDURE — 81001 URINALYSIS AUTO W/SCOPE: CPT

## 2024-04-30 PROCEDURE — 85025 COMPLETE CBC W/AUTO DIFF WBC: CPT

## 2024-04-30 PROCEDURE — 36430 TRANSFUSION BLD/BLD COMPNT: CPT

## 2024-04-30 PROCEDURE — 97161 PT EVAL LOW COMPLEX 20 MIN: CPT

## 2024-04-30 PROCEDURE — 99285 EMERGENCY DEPT VISIT HI MDM: CPT

## 2024-04-30 PROCEDURE — 86900 BLOOD TYPING SEROLOGIC ABO: CPT

## 2024-04-30 PROCEDURE — 85014 HEMATOCRIT: CPT

## 2024-04-30 PROCEDURE — 86850 RBC ANTIBODY SCREEN: CPT

## 2024-04-30 PROCEDURE — 82948 REAGENT STRIP/BLOOD GLUCOSE: CPT

## 2024-04-30 PROCEDURE — 83880 ASSAY OF NATRIURETIC PEPTIDE: CPT

## 2024-04-30 PROCEDURE — 86901 BLOOD TYPING SEROLOGIC RH(D): CPT

## 2024-04-30 PROCEDURE — 97165 OT EVAL LOW COMPLEX 30 MIN: CPT

## 2024-04-30 PROCEDURE — 51798 US URINE CAPACITY MEASURE: CPT

## 2024-04-30 PROCEDURE — 93005 ELECTROCARDIOGRAM TRACING: CPT

## 2024-04-30 PROCEDURE — 80053 COMPREHEN METABOLIC PANEL: CPT

## 2024-04-30 PROCEDURE — 96374 THER/PROPH/DIAG INJ IV PUSH: CPT

## 2024-04-30 PROCEDURE — G0328 FECAL BLOOD SCRN IMMUNOASSAY: HCPCS

## 2024-04-30 PROCEDURE — 80048 BASIC METABOLIC PNL TOTAL CA: CPT

## 2024-04-30 PROCEDURE — 36415 COLL VENOUS BLD VENIPUNCTURE: CPT

## 2024-04-30 PROCEDURE — 88305 TISSUE EXAM BY PATHOLOGIST: CPT

## 2024-04-30 PROCEDURE — 97530 THERAPEUTIC ACTIVITIES: CPT

## 2024-04-30 PROCEDURE — 96376 TX/PRO/DX INJ SAME DRUG ADON: CPT

## 2024-04-30 PROCEDURE — 85018 HEMOGLOBIN: CPT

## 2024-04-30 PROCEDURE — 51702 INSERT TEMP BLADDER CATH: CPT

## 2024-04-30 PROCEDURE — P9016 RBC LEUKOCYTES REDUCED: HCPCS

## 2024-04-30 RX ADMIN — PANTOPRAZOLE SODIUM 40 MG: 40 INJECTION, POWDER, FOR SOLUTION INTRAVENOUS at 21:24

## 2024-05-01 VITALS
OXYGEN SATURATION: 94 % | TEMPERATURE: 97.88 F | DIASTOLIC BLOOD PRESSURE: 72 MMHG | HEART RATE: 68 BPM | SYSTOLIC BLOOD PRESSURE: 109 MMHG

## 2024-05-01 VITALS
DIASTOLIC BLOOD PRESSURE: 60 MMHG | OXYGEN SATURATION: 95 % | HEART RATE: 63 BPM | SYSTOLIC BLOOD PRESSURE: 93 MMHG | TEMPERATURE: 98 F

## 2024-05-01 VITALS
OXYGEN SATURATION: 90 % | HEART RATE: 64 BPM | DIASTOLIC BLOOD PRESSURE: 65 MMHG | SYSTOLIC BLOOD PRESSURE: 117 MMHG | TEMPERATURE: 97.88 F

## 2024-05-01 VITALS
TEMPERATURE: 98.4 F | OXYGEN SATURATION: 97 % | HEART RATE: 87 BPM | DIASTOLIC BLOOD PRESSURE: 50 MMHG | SYSTOLIC BLOOD PRESSURE: 104 MMHG

## 2024-05-01 VITALS
DIASTOLIC BLOOD PRESSURE: 64 MMHG | SYSTOLIC BLOOD PRESSURE: 106 MMHG | OXYGEN SATURATION: 92 % | HEART RATE: 70 BPM | TEMPERATURE: 97.7 F

## 2024-05-01 VITALS — HEART RATE: 66 BPM

## 2024-05-01 VITALS
HEART RATE: 76 BPM | OXYGEN SATURATION: 94 % | DIASTOLIC BLOOD PRESSURE: 67 MMHG | TEMPERATURE: 98.9 F | SYSTOLIC BLOOD PRESSURE: 101 MMHG

## 2024-05-01 VITALS — HEART RATE: 74 BPM

## 2024-05-01 VITALS — HEART RATE: 73 BPM | DIASTOLIC BLOOD PRESSURE: 54 MMHG | OXYGEN SATURATION: 96 % | SYSTOLIC BLOOD PRESSURE: 108 MMHG

## 2024-05-01 VITALS — HEART RATE: 78 BPM

## 2024-05-01 VITALS
TEMPERATURE: 98.06 F | HEART RATE: 57 BPM | SYSTOLIC BLOOD PRESSURE: 94 MMHG | OXYGEN SATURATION: 98 % | DIASTOLIC BLOOD PRESSURE: 56 MMHG

## 2024-05-01 VITALS
TEMPERATURE: 97.8 F | DIASTOLIC BLOOD PRESSURE: 57 MMHG | OXYGEN SATURATION: 93 % | HEART RATE: 68 BPM | SYSTOLIC BLOOD PRESSURE: 107 MMHG

## 2024-05-01 VITALS — OXYGEN SATURATION: 96 % | DIASTOLIC BLOOD PRESSURE: 58 MMHG | HEART RATE: 70 BPM | SYSTOLIC BLOOD PRESSURE: 109 MMHG

## 2024-05-01 VITALS
TEMPERATURE: 98.3 F | DIASTOLIC BLOOD PRESSURE: 71 MMHG | OXYGEN SATURATION: 90 % | SYSTOLIC BLOOD PRESSURE: 114 MMHG | HEART RATE: 89 BPM

## 2024-05-01 VITALS — HEART RATE: 72 BPM

## 2024-05-01 VITALS — HEART RATE: 58 BPM

## 2024-05-01 VITALS
OXYGEN SATURATION: 98 % | SYSTOLIC BLOOD PRESSURE: 100 MMHG | HEART RATE: 63 BPM | DIASTOLIC BLOOD PRESSURE: 64 MMHG | TEMPERATURE: 97.88 F

## 2024-05-01 VITALS — HEART RATE: 70 BPM

## 2024-05-01 VITALS — HEART RATE: 69 BPM | OXYGEN SATURATION: 96 % | SYSTOLIC BLOOD PRESSURE: 136 MMHG | DIASTOLIC BLOOD PRESSURE: 63 MMHG

## 2024-05-01 VITALS — DIASTOLIC BLOOD PRESSURE: 59 MMHG | HEART RATE: 71 BPM | OXYGEN SATURATION: 97 % | SYSTOLIC BLOOD PRESSURE: 109 MMHG

## 2024-05-01 VITALS
SYSTOLIC BLOOD PRESSURE: 107 MMHG | DIASTOLIC BLOOD PRESSURE: 69 MMHG | HEART RATE: 66 BPM | OXYGEN SATURATION: 98 % | TEMPERATURE: 97.88 F

## 2024-05-01 VITALS — OXYGEN SATURATION: 96 % | DIASTOLIC BLOOD PRESSURE: 63 MMHG | SYSTOLIC BLOOD PRESSURE: 109 MMHG | HEART RATE: 75 BPM

## 2024-05-01 VITALS — HEART RATE: 88 BPM

## 2024-05-01 VITALS — HEART RATE: 73 BPM

## 2024-05-01 VITALS — HEART RATE: 65 BPM

## 2024-05-01 VITALS — HEART RATE: 81 BPM

## 2024-05-01 VITALS — HEART RATE: 63 BPM

## 2024-05-01 VITALS
OXYGEN SATURATION: 95 % | SYSTOLIC BLOOD PRESSURE: 108 MMHG | HEART RATE: 59 BPM | DIASTOLIC BLOOD PRESSURE: 70 MMHG | TEMPERATURE: 98.2 F

## 2024-05-01 VITALS — HEART RATE: 62 BPM

## 2024-05-01 VITALS — HEART RATE: 75 BPM

## 2024-05-01 LAB
ADD MANUAL DIFF: NO
ALANINE AMINOTRANSFERASE: 18 U/L (ref 14–59)
ALBUMIN GLOBULIN RATIO: 1
ALBUMIN LEVEL: 2.8 G/DL (ref 3.4–5)
ALKALINE PHOSPHATASE: 96 U/L (ref 46–116)
ANION GAP: 13.1
ASPARTATE AMINO TRANSFERASE: 14 U/L (ref 15–37)
BLOOD UREA NITROGEN: 11 MG/DL (ref 7–18)
CALCIUM: 8.9 MG/DL (ref 8.5–10.1)
CARBON DIOXIDE: 25.5 MMOL/L (ref 21–32)
CAST SEEN?: (no result) #/LPF
CHLORIDE: 105 MMOL/L (ref 98–107)
ESTIMATED GFR (AFRICAN AMERICA: 53 (ref 60–?)
ESTIMATED GFR (NON-AFRICAN AME: 43 (ref 60–?)
GLOBULIN: 2.7 G/DL
GLUCOSE URINE UA: NEGATIVE MG/DL
GLUCOSE: 93 MG/DL (ref 74–106)
HEMATOCRIT: 29.8 % (ref 36–48)
HEMATOCRIT: 30.1 % (ref 36–48)
HEMOGLOBIN: 8.9 G/DL (ref 12–16)
HEMOGLOBIN: 9 G/DL (ref 12–16)
IMMATURE GRANULOCYTES ABS AUTO: 0.05 10^3/UL (ref 0–0.03)
IMMATURE GRANULOCYTES PCT AUTO: 0.7 % (ref 0–0.5)
LYMPHOCYTES  ABSOLUTE AUTO: 1 10^3/UL (ref 1.2–3.8)
MCV RBC: 91.5 FL (ref 81–99)
MEAN CORPUSCULAR HEMOGLOBIN: 27.4 PG (ref 26.7–34)
MEAN CORPUSCULAR HGB CONC: 29.9 G/DL (ref 29.9–35.2)
NT PRO B TYPE NATRIURETIC PEPT: 257 PG/ML (ref ?–900)
PLATELET COUNT: 392 10^3/UL (ref 150–450)
POTASSIUM: 4.6 MMOL/L (ref 3.5–5.1)
RED BLOOD COUNT: 3.29 10^6/UL (ref 4.2–5.4)
SODIUM: 139 MMOL/L (ref 136–145)
TOTAL PROTEIN: 5.5 G/DL (ref 6.4–8.2)
WHITE BLOOD COUNT: 6.7 10^3/UL (ref 4–11)

## 2024-05-01 PROCEDURE — 0UJHXZZ INSPECTION OF VAGINA AND CUL-DE-SAC, EXTERNAL APPROACH: ICD-10-PCS | Performed by: OBSTETRICS & GYNECOLOGY

## 2024-05-01 RX ADMIN — MONTELUKAST 10 MG: 10 TABLET, FILM COATED ORAL at 21:20

## 2024-05-01 RX ADMIN — TOPIRAMATE 25 MG: 25 TABLET, FILM COATED ORAL at 15:27

## 2024-05-01 RX ADMIN — RANOLAZINE 500 MG: 500 TABLET, EXTENDED RELEASE ORAL at 15:28

## 2024-05-01 RX ADMIN — RANOLAZINE 500 MG: 500 TABLET, EXTENDED RELEASE ORAL at 21:20

## 2024-05-01 RX ADMIN — ATORVASTATIN CALCIUM 40 MG: 40 TABLET, FILM COATED ORAL at 15:27

## 2024-05-01 RX ADMIN — FERROUS SULFATE TAB 325 MG (65 MG ELEMENTAL FE) 325 MG: 325 (65 FE) TAB at 15:27

## 2024-05-01 RX ADMIN — FOLIC ACID-PYRIDOXINE-CYANOCOBALAMIN TAB 2.5-25-2 MG 1 TAB: 2.5-25-2 TAB at 15:27

## 2024-05-01 RX ADMIN — PANTOPRAZOLE SODIUM 40 MG: 40 INJECTION, POWDER, FOR SOLUTION INTRAVENOUS at 21:20

## 2024-05-01 RX ADMIN — SODIUM CHLORIDE 100 ML: 900 INJECTION, SOLUTION INTRAVENOUS at 10:20

## 2024-05-01 RX ADMIN — PANTOPRAZOLE SODIUM 40 MG: 40 INJECTION, POWDER, FOR SOLUTION INTRAVENOUS at 10:21

## 2024-05-01 RX ADMIN — TRAZODONE HYDROCHLORIDE 25 MG: 50 TABLET ORAL at 21:20

## 2024-05-01 RX ADMIN — CHOLECALCIFEROL TAB 125 MCG (5000 UNIT) 125 MCG: 125 TAB at 15:27

## 2024-05-02 ENCOUNTER — APPOINTMENT (OUTPATIENT)
Dept: MRI IMAGING | Age: 69
DRG: 744 | End: 2024-05-02
Attending: OBSTETRICS & GYNECOLOGY
Payer: MEDICARE

## 2024-05-02 ENCOUNTER — HOSPITAL ENCOUNTER (INPATIENT)
Age: 69
LOS: 2 days | Discharge: NURSING, REHAB OR LONG-TERM CARE | DRG: 744 | End: 2024-05-04
Attending: OBSTETRICS & GYNECOLOGY | Admitting: OBSTETRICS & GYNECOLOGY
Payer: MEDICARE

## 2024-05-02 ENCOUNTER — APPOINTMENT (OUTPATIENT)
Dept: CT IMAGING | Age: 69
DRG: 744 | End: 2024-05-02
Attending: OBSTETRICS & GYNECOLOGY
Payer: MEDICARE

## 2024-05-02 VITALS — HEART RATE: 78 BPM

## 2024-05-02 DIAGNOSIS — R31.9 BLEEDING FROM THE GENITOURINARY SYSTEM: ICD-10-CM

## 2024-05-02 PROBLEM — N89.8 VAGINAL MASS: Status: ACTIVE | Noted: 2024-05-02

## 2024-05-02 LAB
ABO + RH BLD: NORMAL
ALBUMIN SERPL-MCNC: 3.5 G/DL (ref 3.5–5.2)
ALBUMIN/GLOB SERPL: 2 {RATIO} (ref 1–2.5)
ALP SERPL-CCNC: 85 U/L (ref 35–104)
ALT SERPL-CCNC: 9 U/L (ref 10–35)
ANION GAP SERPL CALCULATED.3IONS-SCNC: 12 MMOL/L (ref 9–16)
ARM BAND NUMBER: NORMAL
AST SERPL-CCNC: 20 U/L (ref 10–35)
BASOPHILS # BLD: <0.03 K/UL (ref 0–0.2)
BASOPHILS NFR BLD: 0 % (ref 0–2)
BILIRUB SERPL-MCNC: 0.2 MG/DL (ref 0–1.2)
BLOOD BANK SAMPLE EXPIRATION: NORMAL
BLOOD GROUP ANTIBODIES SERPL: NEGATIVE
BUN SERPL-MCNC: 10 MG/DL (ref 8–23)
CALCIUM SERPL-MCNC: 8.4 MG/DL (ref 8.6–10.4)
CHLORIDE SERPL-SCNC: 108 MMOL/L (ref 98–107)
CHOLEST SERPL-MCNC: 79 MG/DL (ref 0–199)
CHOLESTEROL/HDL RATIO: 1
CO2 SERPL-SCNC: 17 MMOL/L (ref 20–31)
CREAT SERPL-MCNC: 0.9 MG/DL (ref 0.5–0.9)
EOSINOPHIL # BLD: 0.03 K/UL (ref 0–0.44)
EOSINOPHILS RELATIVE PERCENT: 0 % (ref 1–4)
ERYTHROCYTE [DISTWIDTH] IN BLOOD BY AUTOMATED COUNT: 15.9 % (ref 11.8–14.4)
EST. AVERAGE GLUCOSE BLD GHB EST-MCNC: 82 MG/DL
GFR, ESTIMATED: 66 ML/MIN/1.73M2
GLUCOSE BLD-MCNC: 104 MG/DL (ref 65–105)
GLUCOSE BLD-MCNC: 86 MG/DL (ref 65–105)
GLUCOSE BLD-MCNC: 89 MG/DL (ref 65–105)
GLUCOSE BLD-MCNC: 91 MG/DL (ref 65–105)
GLUCOSE SERPL-MCNC: 99 MG/DL (ref 74–99)
HBA1C MFR BLD: 4.5 % (ref 4–6)
HCT VFR BLD AUTO: 29.5 % (ref 36.3–47.1)
HDLC SERPL-MCNC: 55 MG/DL
HGB BLD-MCNC: 8.7 G/DL (ref 11.9–15.1)
IMM GRANULOCYTES # BLD AUTO: 0.06 K/UL (ref 0–0.3)
IMM GRANULOCYTES NFR BLD: 1 %
LDLC SERPL CALC-MCNC: 11 MG/DL (ref 0–100)
LYMPHOCYTES NFR BLD: 0.86 K/UL (ref 1.1–3.7)
LYMPHOCYTES RELATIVE PERCENT: 10 % (ref 24–43)
MCH RBC QN AUTO: 26.9 PG (ref 25.2–33.5)
MCHC RBC AUTO-ENTMCNC: 29.5 G/DL (ref 28.4–34.8)
MCV RBC AUTO: 91.3 FL (ref 82.6–102.9)
MONOCYTES NFR BLD: 0.72 K/UL (ref 0.1–1.2)
MONOCYTES NFR BLD: 9 % (ref 3–12)
NEUTROPHILS NFR BLD: 80 % (ref 36–65)
NEUTS SEG NFR BLD: 6.82 K/UL (ref 1.5–8.1)
NRBC BLD-RTO: 0 PER 100 WBC
PLATELET # BLD AUTO: 389 K/UL (ref 138–453)
PMV BLD AUTO: 8.5 FL (ref 8.1–13.5)
POTASSIUM SERPL-SCNC: 4.5 MMOL/L (ref 3.7–5.3)
PROT SERPL-MCNC: 5 G/DL (ref 6.6–8.7)
RBC # BLD AUTO: 3.23 M/UL (ref 3.95–5.11)
RBC # BLD: ABNORMAL 10*6/UL
SODIUM SERPL-SCNC: 137 MMOL/L (ref 136–145)
TRIGL SERPL-MCNC: 66 MG/DL
VLDLC SERPL CALC-MCNC: 13 MG/DL
WBC OTHER # BLD: 8.5 K/UL (ref 3.5–11.3)

## 2024-05-02 PROCEDURE — 86901 BLOOD TYPING SEROLOGIC RH(D): CPT

## 2024-05-02 PROCEDURE — 93005 ELECTROCARDIOGRAM TRACING: CPT

## 2024-05-02 PROCEDURE — 92610 EVALUATE SWALLOWING FUNCTION: CPT

## 2024-05-02 PROCEDURE — 6370000000 HC RX 637 (ALT 250 FOR IP)

## 2024-05-02 PROCEDURE — 86900 BLOOD TYPING SEROLOGIC ABO: CPT

## 2024-05-02 PROCEDURE — A4216 STERILE WATER/SALINE, 10 ML: HCPCS

## 2024-05-02 PROCEDURE — 70450 CT HEAD/BRAIN W/O DYE: CPT

## 2024-05-02 PROCEDURE — 2500000003 HC RX 250 WO HCPCS

## 2024-05-02 PROCEDURE — 51798 US URINE CAPACITY MEASURE: CPT

## 2024-05-02 PROCEDURE — 72197 MRI PELVIS W/O & W/DYE: CPT

## 2024-05-02 PROCEDURE — 85025 COMPLETE CBC W/AUTO DIFF WBC: CPT

## 2024-05-02 PROCEDURE — 1200000000 HC SEMI PRIVATE

## 2024-05-02 PROCEDURE — A9579 GAD-BASE MR CONTRAST NOS,1ML: HCPCS

## 2024-05-02 PROCEDURE — 80061 LIPID PANEL: CPT

## 2024-05-02 PROCEDURE — 36415 COLL VENOUS BLD VENIPUNCTURE: CPT

## 2024-05-02 PROCEDURE — 86850 RBC ANTIBODY SCREEN: CPT

## 2024-05-02 PROCEDURE — 82947 ASSAY GLUCOSE BLOOD QUANT: CPT

## 2024-05-02 PROCEDURE — 6360000004 HC RX CONTRAST MEDICATION

## 2024-05-02 PROCEDURE — 83036 HEMOGLOBIN GLYCOSYLATED A1C: CPT

## 2024-05-02 PROCEDURE — 93005 ELECTROCARDIOGRAM TRACING: CPT | Performed by: OBSTETRICS & GYNECOLOGY

## 2024-05-02 PROCEDURE — 80053 COMPREHEN METABOLIC PANEL: CPT

## 2024-05-02 PROCEDURE — 2580000003 HC RX 258

## 2024-05-02 RX ORDER — ALBUTEROL SULFATE 90 UG/1
2 AEROSOL, METERED RESPIRATORY (INHALATION) EVERY 6 HOURS PRN
Status: DISCONTINUED | OUTPATIENT
Start: 2024-05-02 | End: 2024-05-04 | Stop reason: HOSPADM

## 2024-05-02 RX ORDER — ACETAMINOPHEN 500 MG
1000 TABLET ORAL EVERY 6 HOURS PRN
Status: DISCONTINUED | OUTPATIENT
Start: 2024-05-02 | End: 2024-05-04 | Stop reason: HOSPADM

## 2024-05-02 RX ORDER — SODIUM CHLORIDE 0.9 % (FLUSH) 0.9 %
5-40 SYRINGE (ML) INJECTION PRN
Status: DISCONTINUED | OUTPATIENT
Start: 2024-05-02 | End: 2024-05-04 | Stop reason: HOSPADM

## 2024-05-02 RX ORDER — IPRATROPIUM BROMIDE AND ALBUTEROL SULFATE 2.5; .5 MG/3ML; MG/3ML
1 SOLUTION RESPIRATORY (INHALATION) EVERY 4 HOURS PRN
Status: DISCONTINUED | OUTPATIENT
Start: 2024-05-02 | End: 2024-05-04 | Stop reason: HOSPADM

## 2024-05-02 RX ORDER — SENNOSIDES A AND B 8.6 MG/1
2 TABLET, FILM COATED ORAL 2 TIMES DAILY
Status: DISCONTINUED | OUTPATIENT
Start: 2024-05-02 | End: 2024-05-04 | Stop reason: HOSPADM

## 2024-05-02 RX ORDER — ONDANSETRON 4 MG/1
4 TABLET, ORALLY DISINTEGRATING ORAL EVERY 8 HOURS PRN
Status: DISCONTINUED | OUTPATIENT
Start: 2024-05-02 | End: 2024-05-04 | Stop reason: HOSPADM

## 2024-05-02 RX ORDER — MONTELUKAST SODIUM 10 MG/1
10 TABLET ORAL NIGHTLY
Status: DISCONTINUED | OUTPATIENT
Start: 2024-05-02 | End: 2024-05-04 | Stop reason: HOSPADM

## 2024-05-02 RX ORDER — SODIUM CHLORIDE 9 MG/ML
INJECTION, SOLUTION INTRAVENOUS CONTINUOUS
Status: DISCONTINUED | OUTPATIENT
Start: 2024-05-02 | End: 2024-05-04

## 2024-05-02 RX ORDER — GLUCAGON 1 MG/ML
1 KIT INJECTION PRN
Status: DISCONTINUED | OUTPATIENT
Start: 2024-05-02 | End: 2024-05-04 | Stop reason: HOSPADM

## 2024-05-02 RX ORDER — TOPIRAMATE 25 MG/1
25 TABLET ORAL DAILY
Status: DISCONTINUED | OUTPATIENT
Start: 2024-05-02 | End: 2024-05-04 | Stop reason: HOSPADM

## 2024-05-02 RX ORDER — SODIUM CHLORIDE 9 MG/ML
INJECTION, SOLUTION INTRAVENOUS PRN
Status: DISCONTINUED | OUTPATIENT
Start: 2024-05-02 | End: 2024-05-04 | Stop reason: HOSPADM

## 2024-05-02 RX ORDER — ACARBOSE 50 MG/1
100 TABLET ORAL 2 TIMES DAILY
Status: DISCONTINUED | OUTPATIENT
Start: 2024-05-02 | End: 2024-05-04 | Stop reason: HOSPADM

## 2024-05-02 RX ORDER — NITROGLYCERIN 0.4 MG/1
0.4 TABLET SUBLINGUAL EVERY 5 MIN PRN
Status: DISCONTINUED | OUTPATIENT
Start: 2024-05-02 | End: 2024-05-04 | Stop reason: HOSPADM

## 2024-05-02 RX ORDER — INSULIN LISPRO 100 [IU]/ML
0-10 INJECTION, SOLUTION INTRAVENOUS; SUBCUTANEOUS
Status: DISCONTINUED | OUTPATIENT
Start: 2024-05-02 | End: 2024-05-04 | Stop reason: HOSPADM

## 2024-05-02 RX ORDER — 0.9 % SODIUM CHLORIDE 0.9 %
30 INTRAVENOUS SOLUTION INTRAVENOUS ONCE
Status: COMPLETED | OUTPATIENT
Start: 2024-05-02 | End: 2024-05-02

## 2024-05-02 RX ORDER — ACETAMINOPHEN 500 MG
1000 TABLET ORAL EVERY 6 HOURS PRN
Status: DISCONTINUED | OUTPATIENT
Start: 2024-05-02 | End: 2024-05-02 | Stop reason: SDUPTHER

## 2024-05-02 RX ORDER — DEXTROSE MONOHYDRATE 100 MG/ML
INJECTION, SOLUTION INTRAVENOUS CONTINUOUS PRN
Status: DISCONTINUED | OUTPATIENT
Start: 2024-05-02 | End: 2024-05-04 | Stop reason: HOSPADM

## 2024-05-02 RX ORDER — ONDANSETRON 2 MG/ML
4 INJECTION INTRAMUSCULAR; INTRAVENOUS EVERY 6 HOURS PRN
Status: DISCONTINUED | OUTPATIENT
Start: 2024-05-02 | End: 2024-05-04 | Stop reason: HOSPADM

## 2024-05-02 RX ORDER — FLUTICASONE PROPIONATE 50 MCG
2 SPRAY, SUSPENSION (ML) NASAL DAILY PRN
Status: DISCONTINUED | OUTPATIENT
Start: 2024-05-02 | End: 2024-05-04 | Stop reason: HOSPADM

## 2024-05-02 RX ADMIN — ACARBOSE 100 MG: 50 TABLET ORAL at 11:18

## 2024-05-02 RX ADMIN — SODIUM CHLORIDE, PRESERVATIVE FREE 10 ML: 5 INJECTION INTRAVENOUS at 18:34

## 2024-05-02 RX ADMIN — SODIUM CHLORIDE 30 ML: 9 INJECTION, SOLUTION INTRAVENOUS at 20:06

## 2024-05-02 RX ADMIN — SENNOSIDES 17.2 MG: 8.6 TABLET, FILM COATED ORAL at 20:35

## 2024-05-02 RX ADMIN — ACARBOSE 100 MG: 50 TABLET ORAL at 21:47

## 2024-05-02 RX ADMIN — TOPIRAMATE 25 MG: 25 TABLET, FILM COATED ORAL at 11:18

## 2024-05-02 RX ADMIN — SENNOSIDES 17.2 MG: 8.6 TABLET, FILM COATED ORAL at 11:18

## 2024-05-02 RX ADMIN — MONTELUKAST SODIUM 10 MG: 10 TABLET, FILM COATED ORAL at 20:35

## 2024-05-02 RX ADMIN — SODIUM CHLORIDE, PRESERVATIVE FREE 10 ML: 5 INJECTION INTRAVENOUS at 20:04

## 2024-05-02 RX ADMIN — FAMOTIDINE 40 MG: 10 INJECTION, SOLUTION INTRAVENOUS at 11:18

## 2024-05-02 RX ADMIN — GADOTERIDOL 17 ML: 279.3 INJECTION, SOLUTION INTRAVENOUS at 18:34

## 2024-05-02 RX ADMIN — SODIUM CHLORIDE: 9 INJECTION, SOLUTION INTRAVENOUS at 04:14

## 2024-05-02 NOTE — PLAN OF CARE
Problem: Safety - Adult  Goal: Free from fall injury  5/2/2024 1852 by Margo Perez RN  Outcome: Progressing     Problem: Skin/Tissue Integrity  Goal: Absence of new skin breakdown  Description: 1.  Monitor for areas of redness and/or skin breakdown  2.  Assess vascular access sites hourly  3.  Every 4-6 hours minimum:  Change oxygen saturation probe site  4.  Every 4-6 hours:  If on nasal continuous positive airway pressure, respiratory therapy assess nares and determine need for appliance change or resting period.  Outcome: Progressing     Problem: ABCDS Injury Assessment  Goal: Absence of physical injury  Outcome: Progressing

## 2024-05-02 NOTE — PROGRESS NOTES
Obstetric/Gynecology Resident Interval Note    At time of rounding, patient in MRI suite obtaining MRI pelvis.    Dr. Rocha, GYN/ONC attending, present at bedside to discuss evaluation and management plans with patient's son, Iggy.  Iggy is the patient's power of  and medical decision maker.  Paperwork uploaded to patient's chart in media.    After discussion with patient's son, consent was obtained for an exam under anesthesia, hysteroscopic dilation and curettage, possible biopsy, possible cystoscopy, possible anoscopy, and any other indicated procedure.    Discussed with Iggy that pending MRI results, will plan to take patient for surgery tomorrow afternoon.    Repeat CBC, CMP ordered for 5/3/2020 4 in the AM.    Patient is currently tolerating a diet.  IV fluids to be discontinued until midnight.  Patient to be made n.p.o. at midnight for surgery tomorrow.    Encouraged ambulation, PO hydration, sitting in surgical chair as able.    Will continue to monitor patient's status closely.    Vitals:    05/02/24 0341 05/02/24 0738 05/02/24 1116 05/02/24 1520   BP: 113/60 (!) 96/54 (!) 108/50 (!) 104/59   Pulse: 70 73  79   Resp: 16 16  16   Temp: 97.6 °F (36.4 °C) 98.8 °F (37.1 °C)  98.9 °F (37.2 °C)   TempSrc: Oral Oral  Oral   SpO2: 96% 97%  96%   Weight: 88.1 kg (194 lb 3.6 oz)      Height: 1.626 m (5' 4\")        Recent Results (from the past 24 hour(s))   EKG 12 Lead    Collection Time: 05/02/24  4:31 AM   Result Value Ref Range    Ventricular Rate 62 BPM    Atrial Rate 62 BPM    P-R Interval 168 ms    QRS Duration 86 ms    Q-T Interval 404 ms    QTc Calculation (Bazett) 410 ms    P Axis 40 degrees    R Axis 6 degrees    T Axis 20 degrees   EKG 12 Lead    Collection Time: 05/02/24  4:32 AM   Result Value Ref Range    Ventricular Rate 65 BPM    Atrial Rate 65 BPM    P-R Interval 172 ms    QRS Duration 86 ms    Q-T Interval 406 ms    QTc Calculation (Bazett) 422 ms    P Axis 42 degrees    R Axis 7 degrees     T Axis 19 degrees   Comprehensive Metabolic Panel w/ Reflex to MG    Collection Time: 05/02/24  5:08 AM   Result Value Ref Range    Sodium 137 136 - 145 mmol/L    Potassium 4.5 3.7 - 5.3 mmol/L    Chloride 108 (H) 98 - 107 mmol/L    CO2 17 (L) 20 - 31 mmol/L    Anion Gap 12 9 - 16 mmol/L    Glucose 99 74 - 99 mg/dL    BUN 10 8 - 23 mg/dL    Creatinine 0.9 0.50 - 0.90 mg/dL    Est, Glom Filt Rate 66 >60 mL/min/1.73m2    Calcium 8.4 (L) 8.6 - 10.4 mg/dL    Total Protein 5.0 (L) 6.6 - 8.7 g/dL    Albumin 3.5 3.5 - 5.2 g/dL    Albumin/Globulin Ratio 2.0 1.0 - 2.5    Total Bilirubin 0.2 0.00 - 1.20 mg/dL    Alkaline Phosphatase 85 35 - 104 U/L    ALT 9 (L) 10 - 35 U/L    AST 20 10 - 35 U/L   TYPE AND SCREEN    Collection Time: 05/02/24  5:08 AM   Result Value Ref Range    Blood Bank Sample Expiration 05/05/2024,2359     Arm Band Number [id number]     ABO/Rh O POSITIVE     Antibody Screen NEGATIVE    Lipid Panel    Collection Time: 05/02/24  5:08 AM   Result Value Ref Range    Cholesterol, Total 79 0 - 199 mg/dL    HDL 55 >40 mg/dL    LDL Cholesterol 11 0 - 100 mg/dL    Chol/HDL Ratio 1.0     Triglycerides 66 <150 mg/dL    VLDL 13 mg/dL   CBC with Auto Differential    Collection Time: 05/02/24  6:05 AM   Result Value Ref Range    WBC 8.5 3.5 - 11.3 k/uL    RBC 3.23 (L) 3.95 - 5.11 m/uL    Hemoglobin 8.7 (L) 11.9 - 15.1 g/dL    Hematocrit 29.5 (L) 36.3 - 47.1 %    MCV 91.3 82.6 - 102.9 fL    MCH 26.9 25.2 - 33.5 pg    MCHC 29.5 28.4 - 34.8 g/dL    RDW 15.9 (H) 11.8 - 14.4 %    Platelets 389 138 - 453 k/uL    MPV 8.5 8.1 - 13.5 fL    NRBC Automated 0.0 0.0 per 100 WBC    Neutrophils % 80 (H) 36 - 65 %    Lymphocytes % 10 (L) 24 - 43 %    Monocytes % 9 3 - 12 %    Eosinophils % 0 (L) 1 - 4 %    Basophils % 0 0 - 2 %    Immature Granulocytes % 1 (H) 0 %    Neutrophils Absolute 6.82 1.50 - 8.10 k/uL    Lymphocytes Absolute 0.86 (L) 1.10 - 3.70 k/uL    Monocytes Absolute 0.72 0.10 - 1.20 k/uL    Eosinophils Absolute 0.03 0.00 -

## 2024-05-02 NOTE — CARE COORDINATION
Case Management Assessment  Initial Evaluation    Date/Time of Evaluation: 5/2/2024 10:45 AM  Assessment Completed by: Yary Rogers RN    If patient is discharged prior to next notation, then this note serves as note for discharge by case management.    Patient Name: Zaira Huizar                   YOB: 1955  Diagnosis: Vaginal mass [N89.8]                   Date / Time: 5/2/2024  3:39 AM    Patient Admission Status: Inpatient   Readmission Risk (Low < 19, Mod (19-27), High > 27): Readmission Risk Score: 9.1    Current PCP: Stanley Lu,   PCP verified by CM? (P) Yes (Stanley Lu [phone])    Chart Reviewed: Yes      History Provided by: (P) Child/Family, Other (see comment) ([facility])  Patient Orientation: (P) Alert and Oriented, Person, Place, Situation, Self    Patient Cognition: (P) Alert    Hospitalization in the last 30 days (Readmission):  No    If yes, Readmission Assessment in CM Navigator will be completed.    Advance Directives:      Code Status: Full Code   Patient's Primary Decision Maker is: (P) Legal Next of Kin (Son will bring in Legal guardian paperwork)      Discharge Planning:    Patient lives with: (P) Alone Type of Home: (P) Assisted living  Primary Care Giver: (P) Self  Patient Support Systems include: (P) Children, Other (Comment) (Assisted Living Staff)   Current Financial resources: (P) Medicaid, Medicare  Current community resources: (P) Assisted Living  Current services prior to admission: (P) None            Current DME:  none            Type of Home Care services:  (P) None    ADLS  Prior functional level: (P) Independent in ADLs/IADLs  Current functional level: (P) Independent in ADLs/IADLs    PT AM-PAC:   /24  OT AM-PAC:   /24    Family can provide assistance at DC: (P) Other (comment) (Assited Living staff)  Would you like Case Management to discuss the discharge plan with any other family members/significant

## 2024-05-02 NOTE — PROGRESS NOTES
SLP ALL NOTES  Facility/Department: [address] MED SURG   CLINICAL BEDSIDE SWALLOW EVALUATION    NAME: Zaira Huizar  : 1955  MRN: [id number]    ADMISSION DATE: 2024  ADMITTING DIAGNOSIS: has Vaginal mass on their problem list.      Date of Eval: 2024  Evaluating Therapist: MACRINA Nino    Current Diet level:  Current Diet : NPO  Current Liquid Diet : NPO    Primary Complaint: Zaira Huizar is a 68 y.o. female presents to [facility] as a direct transfer from [facility] for concern for vaginal cancer.  All the information is derived from a paper chart from [facility].  We do not have access to medical records electronically.         Pain: 0/10       Reason for Referral  Zaira Huizar was referred for a bedside swallow evaluation to assess the efficiency of her swallow function, identify signs and symptoms of aspiration and make recommendations regarding safe dietary consistencies, effective compensatory strategies, and safe eating environment.    Impression: Patient presents with probable safe swallow for Regular diet with thin liquids as evidenced by no overt s/s of aspiration noted with consistencies tested.  Recommend small sips and bites, only feed when alert and awake and upright at 90 degrees for all PO intake.  Recommend close monitoring for overt/clinical s/s of aspiration and D/C PO intake and complete Modified Barium Swallow Study should they occur.  Results and recommendations reported to RN.   Dysphagia Diagnosis: Swallow function appears WFL  Dysphagia Outcome Severity Scale: Level 7: Normal in all situations     Treatment Plan  Requires SLP Intervention: Yes   1-2X  D/C Recommendations: Ongoing speech therapy is recommended at next level of care;Ongoing speech therapy is recommended during this hospitalization   No therapy recommended at discharge.       Recommended Diet and Intervention  Diet Solids Recommendation: Regular  Liquid Consistency Recommendation:

## 2024-05-02 NOTE — DISCHARGE SUMMARY
Gyn/Onc Discharge Summary  [facility]      Patient Name: Zaira Huizar  Patient : 1955  Primary Care Physician: Stanley Lu DO  Admit Date: 2024    Principal Diagnosis: Vaginal Mass    Other Diagnosis:   Vaginal mass [N89.8]  Patient Active Problem List   Diagnosis    Vaginal mass       Infection: No  Hospital Acquired: No    Surgical Operations & Procedures: None    Consultations: None    Pertinent Findings & Procedures:   Zaira Huizar is a 68 y.o. female  admitted for surgical examination of vaginal mass as transfer from [facility];     HD#1 (): Patient is hard of hearing and there was concern for history of stroke vs AMS; CT head 24: wnl  MRI Pelvis: A 5.9 x 3.8 cm enhancing mass within the cervix extending into the lower uterine segment is highly suspicious for cervical cancer and there is extension into the bilateral parametria with apparent invasion of the bladder wall and suspected mild extension into the upper vagina. Uterus appears to be septate and both endometrium moieties are distended with complicated fluid, likely due to obstruction from the cervical mass. Dilated right distal ureter may be due to obstruction from bladder wall implant. External iliac lymphadenopathy, left greater than right, is likely metastatic.   HD #2 POD#0 (5/3): Hgb 9.0, Cr 1.0, Patient underwent Operative Hysteroscopy, Cervix Biopsy, Cystoscopy, Excision of skin mole   HD #3 POD#1 (): Patient discharged for follow up outpatient Monday.     Discharged POD#1 S/p EUA, Operative Hysteroscopy, Cervix Biopsy, Cystoscopy, Excision of skin mole. Discharge instructions reviewed and questions answered.    Course of patient: normal    Discharge to: Home    Readmission planned: No    Recommendations on Discharge:     Medications:     Medication List      You have not been prescribed any medications.           Activity: Pelvic rest. Nothing PV until follow-up established. Resume other activity

## 2024-05-02 NOTE — H&P
Gynecologic Oncology H&P  [facility]    Patient Name: Zaira Huizar     Patient : 1955  Room/Bed: 0343/0343-02  Admission Date/Time: 2024  3:39 AM  Primary Care Physician: No primary care provider on file.        CC: Transfer for Vaginal Mass               HPI: Zaira Huizar is a 68 y.o. female presents to [facility] as a direct transfer from [facility] for concern for vaginal cancer.  All the information is derived from a paper chart from [facility].  We do not have access to medical records electronically.      Patient's Power of  and Health Care Decision Maker is her son: Iggy Huizar ([phone])    Per the [facility] history and physical, initially presented to the hospital at the instruction of her urologist due to low hemoglobin noted on outpatient labs.  On arrival, workup revealed anemia at 6.5 and hypotension noted at 85/48.  Fecal occult blood sample was positive but no overt GI bleeding was noted.  She was admitted overnight for suspected GI bleed versus vaginal bleeding and 2 units of packed red blood cells were given and transfused.  Posttransfusion hemoglobin was noted to be 9.    Per the note she has begun evaluation for bleeding workup by both urology and gynecology.  A CT abdomen pelvis was performed on 23 and noted right moderate hydronephrosis with severe right ureteral dilation without evidence of an obstructing stone or a urothelial lesion.  It also revealed a septated versus didelphic uterus.  A follow-up transvaginal ultrasound noted abnormal thickening of the right and left endometrial cavities of the didelphic uterus measuring right side: 5 cm, left: 4 cm.     Patient was transferred after attempt at surgical management by Dr. Ruiz.  A vaginal mass was noted next to the cervix.  Cervical stenosis was noted. They were unable to stop bleeding that seemed to be originating from the vaginal mass.  D&C was unsuccessful as  Axis 6 degrees    T Axis 20 degrees   TYPE AND SCREEN   Result Value Ref Range    Blood Bank Sample Expiration 05/05/2024,2359     Arm Band Number [id number]     ABO/Rh O POSITIVE     Antibody Screen NEGATIVE        ASSESSMENT & PLAN:    Zaira Huizar is a 68 y.o. female presents with Vaginal Mass + Postmenopausal Bleeding   - Patient presents as a transfer from [place] for concern for vaginal mass.    - Admit patient to the service of Dr. Rocha    - Vitals overall stable   - IVF: 75 cc/hr NS   - Pain control: tylenol prn    - Labs: CBC, CMP, T&S    - DVT Proph: SCDs   - GI: pepcid    - Diet: NPO pending surgery in AM    - Monitor Is/Os   - Patient will need further imaging, evaluation and management in order to proceed to OR with Gyn Onc team for Exam Under Anesthesia, Vaginal Mass Biopsy     Vaginal Mass + PMB   - Noted to have Hgb of 6.5 at [place]   - S/p 2u pRBC > Hgb 8.9. Admission Hgb 8.7   - Dr. Ruiz attempted EUA and D&C however a vaginal mass was noted next to the cervix.  Cervical stenosis was noted. They were unable to stop bleeding that seemed to be originating from the vaginal mass.  D&C was unsuccessful as they could not reach the cervix and patient was subsequently transferred to [facility] under the gyn-oncology service   - Imaging:    - CT A/P (11/27/23)     -Large hiatal hernia.  Moderate right-sided collecting system with severe right ureteral dilation to the level of the distal ureter.  No obstructing ureteral stone.  Colonic diverticuli noted.  Prominent yet not technically enlarged retroperitoneal lymph nodes.  Uterus is present and is either septated or didelphic.    - Pelvic US (11/30/23):     -Didelphic uterus noted.  Uterus size 10.6 x 5.6 x 8.9 cm.  Endometrial thickness: Right 5 cm; left 4.3 cm   - MRI pelvis ordered in order to further evaluate vaginal vs cervical vs parametrial mass   - Bright red blood noted in patient's diaper and on purewick   - Will defer pelvic exam until  significant PMH of schizophrenia    - Home meds include Topomax and Lumaterprone (not on formulary at [facility]. Consulted with IP pharmacy and they recommended patient's son bring in and pharmacy verifies).   - Can consider psychiatry consult if needed   - Continue to monitor      Asthma/COPD   - VSS, no conversational dyspnea noted   - Albuterol/Ipatropium ordered PRN per inpatient protocol   - Continue to monitor     HTN   - BP overall stable at this time   - Sacubitril/Valsaratan ordered with parameters to hold if Systolic <120 or HR <60   - Continue to monitor     Protein Deficient Malnutrition   - Albumin noted to be 2.1 on admission   - Will supplement with Boost/Ensure when not NPO    Hydronephrosis with Hydroureter   - Seen on CT/AP 11/27/23    Hx Appendectomy    Hx C/S    BMI 33    Patient Active Problem List    Diagnosis Date Noted    Vaginal mass 05/02/2024       Plan discussed with Dr. Rocha, who is agreeable.     Attending's Name: Dr. Josefina August MD  Ob/Gyn Resident  [facility]  5/2/2024, 7:33 AM    Attending Physician Statement  I have discussed the care of Zaira Huizar, including pertinent history and exam findings, with the resident. I have seen and examined the patient and the key elements of all parts of the encounter have been performed by me. I agree with the assessment, plan and orders as documented by the resident.     Sincere Rocha MD  Gynecologic Oncology

## 2024-05-03 ENCOUNTER — ANESTHESIA EVENT (OUTPATIENT)
Dept: OPERATING ROOM | Age: 69
End: 2024-05-03
Payer: MEDICARE

## 2024-05-03 ENCOUNTER — ANESTHESIA (OUTPATIENT)
Dept: OPERATING ROOM | Age: 69
End: 2024-05-03
Payer: MEDICARE

## 2024-05-03 LAB
ALBUMIN SERPL-MCNC: 3.6 G/DL (ref 3.5–5.2)
ALBUMIN/GLOB SERPL: 2 {RATIO} (ref 1–2.5)
ALP SERPL-CCNC: 93 U/L (ref 35–104)
ALT SERPL-CCNC: 14 U/L (ref 10–35)
ANION GAP SERPL CALCULATED.3IONS-SCNC: 10 MMOL/L (ref 9–16)
AST SERPL-CCNC: 33 U/L (ref 10–35)
BASOPHILS # BLD: 0.03 K/UL (ref 0–0.2)
BASOPHILS NFR BLD: 0 % (ref 0–2)
BILIRUB SERPL-MCNC: 0.3 MG/DL (ref 0–1.2)
BUN SERPL-MCNC: 11 MG/DL (ref 8–23)
CALCIUM SERPL-MCNC: 8.8 MG/DL (ref 8.6–10.4)
CHLORIDE SERPL-SCNC: 109 MMOL/L (ref 98–107)
CO2 SERPL-SCNC: 19 MMOL/L (ref 20–31)
CREAT SERPL-MCNC: 1 MG/DL (ref 0.5–0.9)
EKG ATRIAL RATE: 62 BPM
EKG ATRIAL RATE: 65 BPM
EKG P AXIS: 40 DEGREES
EKG P AXIS: 42 DEGREES
EKG P-R INTERVAL: 168 MS
EKG P-R INTERVAL: 172 MS
EKG Q-T INTERVAL: 404 MS
EKG Q-T INTERVAL: 406 MS
EKG QRS DURATION: 86 MS
EKG QRS DURATION: 86 MS
EKG QTC CALCULATION (BAZETT): 410 MS
EKG QTC CALCULATION (BAZETT): 422 MS
EKG R AXIS: 6 DEGREES
EKG R AXIS: 7 DEGREES
EKG T AXIS: 19 DEGREES
EKG T AXIS: 20 DEGREES
EKG VENTRICULAR RATE: 62 BPM
EKG VENTRICULAR RATE: 65 BPM
EOSINOPHIL # BLD: 0.31 K/UL (ref 0–0.44)
EOSINOPHILS RELATIVE PERCENT: 5 % (ref 1–4)
ERYTHROCYTE [DISTWIDTH] IN BLOOD BY AUTOMATED COUNT: 16.1 % (ref 11.8–14.4)
GFR, ESTIMATED: 59 ML/MIN/1.73M2
GLUCOSE BLD-MCNC: 120 MG/DL (ref 65–105)
GLUCOSE BLD-MCNC: 83 MG/DL (ref 65–105)
GLUCOSE BLD-MCNC: 83 MG/DL (ref 65–105)
GLUCOSE BLD-MCNC: 88 MG/DL (ref 65–105)
GLUCOSE BLD-MCNC: 92 MG/DL (ref 65–105)
GLUCOSE SERPL-MCNC: 97 MG/DL (ref 74–99)
HCT VFR BLD AUTO: 31.6 % (ref 36.3–47.1)
HGB BLD-MCNC: 9 G/DL (ref 11.9–15.1)
IMM GRANULOCYTES # BLD AUTO: 0.04 K/UL (ref 0–0.3)
IMM GRANULOCYTES NFR BLD: 1 %
LYMPHOCYTES NFR BLD: 1.19 K/UL (ref 1.1–3.7)
LYMPHOCYTES RELATIVE PERCENT: 18 % (ref 24–43)
MCH RBC QN AUTO: 27.4 PG (ref 25.2–33.5)
MCHC RBC AUTO-ENTMCNC: 28.5 G/DL (ref 28.4–34.8)
MCV RBC AUTO: 96 FL (ref 82.6–102.9)
MONOCYTES NFR BLD: 0.91 K/UL (ref 0.1–1.2)
MONOCYTES NFR BLD: 14 % (ref 3–12)
NEUTROPHILS NFR BLD: 62 % (ref 36–65)
NEUTS SEG NFR BLD: 4.27 K/UL (ref 1.5–8.1)
NRBC BLD-RTO: 0 PER 100 WBC
PLATELET # BLD AUTO: 413 K/UL (ref 138–453)
PMV BLD AUTO: 8.6 FL (ref 8.1–13.5)
POTASSIUM SERPL-SCNC: 4.3 MMOL/L (ref 3.7–5.3)
PROT SERPL-MCNC: 5.4 G/DL (ref 6.6–8.7)
RBC # BLD AUTO: 3.29 M/UL (ref 3.95–5.11)
RBC # BLD: ABNORMAL 10*6/UL
SODIUM SERPL-SCNC: 138 MMOL/L (ref 136–145)
WBC OTHER # BLD: 6.8 K/UL (ref 3.5–11.3)

## 2024-05-03 PROCEDURE — 88305 TISSUE EXAM BY PATHOLOGIST: CPT

## 2024-05-03 PROCEDURE — 52000 CYSTOURETHROSCOPY: CPT | Performed by: OBSTETRICS & GYNECOLOGY

## 2024-05-03 PROCEDURE — 2720000010 HC SURG SUPPLY STERILE: Performed by: OBSTETRICS & GYNECOLOGY

## 2024-05-03 PROCEDURE — 11106 INCAL BX SKN SINGLE LES: CPT | Performed by: OBSTETRICS & GYNECOLOGY

## 2024-05-03 PROCEDURE — 6360000002 HC RX W HCPCS: Performed by: ANESTHESIOLOGY

## 2024-05-03 PROCEDURE — 80053 COMPREHEN METABOLIC PANEL: CPT

## 2024-05-03 PROCEDURE — 0TJB8ZZ INSPECTION OF BLADDER, VIA NATURAL OR ARTIFICIAL OPENING ENDOSCOPIC: ICD-10-PCS | Performed by: OBSTETRICS & GYNECOLOGY

## 2024-05-03 PROCEDURE — 6360000002 HC RX W HCPCS

## 2024-05-03 PROCEDURE — 2580000003 HC RX 258

## 2024-05-03 PROCEDURE — 85025 COMPLETE CBC W/AUTO DIFF WBC: CPT

## 2024-05-03 PROCEDURE — 93010 ELECTROCARDIOGRAM REPORT: CPT | Performed by: INTERNAL MEDICINE

## 2024-05-03 PROCEDURE — 3700000000 HC ANESTHESIA ATTENDED CARE: Performed by: OBSTETRICS & GYNECOLOGY

## 2024-05-03 PROCEDURE — 88342 IMHCHEM/IMCYTCHM 1ST ANTB: CPT

## 2024-05-03 PROCEDURE — 3700000001 HC ADD 15 MINUTES (ANESTHESIA): Performed by: OBSTETRICS & GYNECOLOGY

## 2024-05-03 PROCEDURE — 3600000014 HC SURGERY LEVEL 4 ADDTL 15MIN: Performed by: OBSTETRICS & GYNECOLOGY

## 2024-05-03 PROCEDURE — 7100000000 HC PACU RECOVERY - FIRST 15 MIN: Performed by: OBSTETRICS & GYNECOLOGY

## 2024-05-03 PROCEDURE — 57421 EXAM/BIOPSY OF VAG W/SCOPE: CPT | Performed by: OBSTETRICS & GYNECOLOGY

## 2024-05-03 PROCEDURE — 0HB9XZZ EXCISION OF PERINEUM SKIN, EXTERNAL APPROACH: ICD-10-PCS | Performed by: OBSTETRICS & GYNECOLOGY

## 2024-05-03 PROCEDURE — 2709999900 HC NON-CHARGEABLE SUPPLY: Performed by: OBSTETRICS & GYNECOLOGY

## 2024-05-03 PROCEDURE — 7100000001 HC PACU RECOVERY - ADDTL 15 MIN: Performed by: OBSTETRICS & GYNECOLOGY

## 2024-05-03 PROCEDURE — 0UJH8ZZ INSPECTION OF VAGINA AND CUL-DE-SAC, VIA NATURAL OR ARTIFICIAL OPENING ENDOSCOPIC: ICD-10-PCS | Performed by: OBSTETRICS & GYNECOLOGY

## 2024-05-03 PROCEDURE — 2500000003 HC RX 250 WO HCPCS

## 2024-05-03 PROCEDURE — 36415 COLL VENOUS BLD VENIPUNCTURE: CPT

## 2024-05-03 PROCEDURE — 0UBC8ZX EXCISION OF CERVIX, VIA NATURAL OR ARTIFICIAL OPENING ENDOSCOPIC, DIAGNOSTIC: ICD-10-PCS | Performed by: OBSTETRICS & GYNECOLOGY

## 2024-05-03 PROCEDURE — 82947 ASSAY GLUCOSE BLOOD QUANT: CPT

## 2024-05-03 PROCEDURE — 2580000003 HC RX 258: Performed by: OBSTETRICS & GYNECOLOGY

## 2024-05-03 PROCEDURE — 3600000004 HC SURGERY LEVEL 4 BASE: Performed by: OBSTETRICS & GYNECOLOGY

## 2024-05-03 PROCEDURE — 1200000000 HC SEMI PRIVATE

## 2024-05-03 PROCEDURE — 6370000000 HC RX 637 (ALT 250 FOR IP)

## 2024-05-03 RX ORDER — MAGNESIUM HYDROXIDE 1200 MG/15ML
LIQUID ORAL CONTINUOUS PRN
Status: DISCONTINUED | OUTPATIENT
Start: 2024-05-03 | End: 2024-05-03 | Stop reason: HOSPADM

## 2024-05-03 RX ORDER — FENTANYL CITRATE 50 UG/ML
INJECTION, SOLUTION INTRAMUSCULAR; INTRAVENOUS PRN
Status: DISCONTINUED | OUTPATIENT
Start: 2024-05-03 | End: 2024-05-03 | Stop reason: SDUPTHER

## 2024-05-03 RX ORDER — GLYCOPYRROLATE 0.2 MG/ML
INJECTION INTRAMUSCULAR; INTRAVENOUS PRN
Status: DISCONTINUED | OUTPATIENT
Start: 2024-05-03 | End: 2024-05-03 | Stop reason: SDUPTHER

## 2024-05-03 RX ORDER — ONDANSETRON 2 MG/ML
INJECTION INTRAMUSCULAR; INTRAVENOUS PRN
Status: DISCONTINUED | OUTPATIENT
Start: 2024-05-03 | End: 2024-05-03 | Stop reason: SDUPTHER

## 2024-05-03 RX ORDER — PROPOFOL 10 MG/ML
INJECTION, EMULSION INTRAVENOUS PRN
Status: DISCONTINUED | OUTPATIENT
Start: 2024-05-03 | End: 2024-05-03 | Stop reason: SDUPTHER

## 2024-05-03 RX ORDER — SODIUM CHLORIDE 0.9 % (FLUSH) 0.9 %
5-40 SYRINGE (ML) INJECTION EVERY 12 HOURS SCHEDULED
Status: DISCONTINUED | OUTPATIENT
Start: 2024-05-03 | End: 2024-05-03 | Stop reason: HOSPADM

## 2024-05-03 RX ORDER — SODIUM CHLORIDE 0.9 % (FLUSH) 0.9 %
5-40 SYRINGE (ML) INJECTION PRN
Status: DISCONTINUED | OUTPATIENT
Start: 2024-05-03 | End: 2024-05-03 | Stop reason: HOSPADM

## 2024-05-03 RX ORDER — PHENYLEPHRINE HCL IN 0.9% NACL 1 MG/10 ML
SYRINGE (ML) INTRAVENOUS PRN
Status: DISCONTINUED | OUTPATIENT
Start: 2024-05-03 | End: 2024-05-03 | Stop reason: SDUPTHER

## 2024-05-03 RX ORDER — HYDRALAZINE HYDROCHLORIDE 20 MG/ML
10 INJECTION INTRAMUSCULAR; INTRAVENOUS
Status: DISCONTINUED | OUTPATIENT
Start: 2024-05-03 | End: 2024-05-03 | Stop reason: HOSPADM

## 2024-05-03 RX ORDER — SODIUM CHLORIDE 9 MG/ML
INJECTION, SOLUTION INTRAVENOUS PRN
Status: DISCONTINUED | OUTPATIENT
Start: 2024-05-03 | End: 2024-05-03 | Stop reason: HOSPADM

## 2024-05-03 RX ORDER — DEXAMETHASONE SODIUM PHOSPHATE 10 MG/ML
INJECTION INTRAMUSCULAR; INTRAVENOUS PRN
Status: DISCONTINUED | OUTPATIENT
Start: 2024-05-03 | End: 2024-05-03 | Stop reason: SDUPTHER

## 2024-05-03 RX ORDER — NALOXONE HYDROCHLORIDE 0.4 MG/ML
INJECTION, SOLUTION INTRAMUSCULAR; INTRAVENOUS; SUBCUTANEOUS PRN
Status: DISCONTINUED | OUTPATIENT
Start: 2024-05-03 | End: 2024-05-03 | Stop reason: HOSPADM

## 2024-05-03 RX ORDER — LABETALOL HYDROCHLORIDE 5 MG/ML
10 INJECTION, SOLUTION INTRAVENOUS
Status: DISCONTINUED | OUTPATIENT
Start: 2024-05-03 | End: 2024-05-03 | Stop reason: HOSPADM

## 2024-05-03 RX ORDER — ONDANSETRON 2 MG/ML
4 INJECTION INTRAMUSCULAR; INTRAVENOUS
Status: DISCONTINUED | OUTPATIENT
Start: 2024-05-03 | End: 2024-05-03 | Stop reason: HOSPADM

## 2024-05-03 RX ORDER — LIDOCAINE HYDROCHLORIDE 10 MG/ML
INJECTION, SOLUTION EPIDURAL; INFILTRATION; INTRACAUDAL; PERINEURAL PRN
Status: DISCONTINUED | OUTPATIENT
Start: 2024-05-03 | End: 2024-05-03 | Stop reason: SDUPTHER

## 2024-05-03 RX ORDER — SODIUM CHLORIDE, SODIUM LACTATE, POTASSIUM CHLORIDE, CALCIUM CHLORIDE 600; 310; 30; 20 MG/100ML; MG/100ML; MG/100ML; MG/100ML
INJECTION, SOLUTION INTRAVENOUS CONTINUOUS PRN
Status: DISCONTINUED | OUTPATIENT
Start: 2024-05-03 | End: 2024-05-03 | Stop reason: SDUPTHER

## 2024-05-03 RX ADMIN — SODIUM CHLORIDE, PRESERVATIVE FREE 10 ML: 5 INJECTION INTRAVENOUS at 19:56

## 2024-05-03 RX ADMIN — Medication 100 MCG: at 16:18

## 2024-05-03 RX ADMIN — ONDANSETRON 4 MG: 2 INJECTION INTRAMUSCULAR; INTRAVENOUS at 17:28

## 2024-05-03 RX ADMIN — SODIUM CHLORIDE: 9 INJECTION, SOLUTION INTRAVENOUS at 17:23

## 2024-05-03 RX ADMIN — FENTANYL CITRATE 25 MCG: 50 INJECTION, SOLUTION INTRAMUSCULAR; INTRAVENOUS at 16:15

## 2024-05-03 RX ADMIN — PROPOFOL 150 MG: 10 INJECTION, EMULSION INTRAVENOUS at 16:08

## 2024-05-03 RX ADMIN — FENTANYL CITRATE 50 MCG: 50 INJECTION, SOLUTION INTRAMUSCULAR; INTRAVENOUS at 16:57

## 2024-05-03 RX ADMIN — SODIUM CHLORIDE, POTASSIUM CHLORIDE, SODIUM LACTATE AND CALCIUM CHLORIDE: 600; 310; 30; 20 INJECTION, SOLUTION INTRAVENOUS at 16:25

## 2024-05-03 RX ADMIN — PROPOFOL 20 MG: 10 INJECTION, EMULSION INTRAVENOUS at 16:12

## 2024-05-03 RX ADMIN — FENTANYL CITRATE 50 MCG: 50 INJECTION, SOLUTION INTRAMUSCULAR; INTRAVENOUS at 16:37

## 2024-05-03 RX ADMIN — SENNOSIDES 17.2 MG: 8.6 TABLET, FILM COATED ORAL at 08:55

## 2024-05-03 RX ADMIN — PROPOFOL 50 MG: 10 INJECTION, EMULSION INTRAVENOUS at 16:55

## 2024-05-03 RX ADMIN — SACUBITRIL AND VALSARTAN 1 TABLET: 24; 26 TABLET, FILM COATED ORAL at 19:55

## 2024-05-03 RX ADMIN — FENTANYL CITRATE 25 MCG: 50 INJECTION, SOLUTION INTRAMUSCULAR; INTRAVENOUS at 16:54

## 2024-05-03 RX ADMIN — TOPIRAMATE 25 MG: 25 TABLET, FILM COATED ORAL at 08:55

## 2024-05-03 RX ADMIN — DEXAMETHASONE SODIUM PHOSPHATE 10 MG: 10 INJECTION INTRAMUSCULAR; INTRAVENOUS at 16:19

## 2024-05-03 RX ADMIN — HYDROMORPHONE HYDROCHLORIDE 0.5 MG: 1 INJECTION, SOLUTION INTRAMUSCULAR; INTRAVENOUS; SUBCUTANEOUS at 18:28

## 2024-05-03 RX ADMIN — GLYCOPYRROLATE 0.2 MG: 0.2 INJECTION INTRAMUSCULAR; INTRAVENOUS at 16:41

## 2024-05-03 RX ADMIN — LIDOCAINE HYDROCHLORIDE 50 MG: 10 INJECTION, SOLUTION EPIDURAL; INFILTRATION; INTRACAUDAL; PERINEURAL at 16:08

## 2024-05-03 RX ADMIN — MONTELUKAST SODIUM 10 MG: 10 TABLET, FILM COATED ORAL at 19:56

## 2024-05-03 RX ADMIN — ACARBOSE 100 MG: 50 TABLET ORAL at 19:55

## 2024-05-03 RX ADMIN — FENTANYL CITRATE 50 MCG: 50 INJECTION, SOLUTION INTRAMUSCULAR; INTRAVENOUS at 17:04

## 2024-05-03 RX ADMIN — SENNOSIDES 17.2 MG: 8.6 TABLET, FILM COATED ORAL at 19:55

## 2024-05-03 RX ADMIN — SACUBITRIL AND VALSARTAN 1 TABLET: 24; 26 TABLET, FILM COATED ORAL at 08:55

## 2024-05-03 RX ADMIN — PROPOFOL 30 MG: 10 INJECTION, EMULSION INTRAVENOUS at 16:15

## 2024-05-03 RX ADMIN — PROPOFOL 40 MG: 10 INJECTION, EMULSION INTRAVENOUS at 17:51

## 2024-05-03 RX ADMIN — Medication 2 G: at 16:18

## 2024-05-03 ASSESSMENT — PAIN SCALES - GENERAL
PAINLEVEL_OUTOF10: 4
PAINLEVEL_OUTOF10: 7

## 2024-05-03 ASSESSMENT — PAIN DESCRIPTION - ORIENTATION: ORIENTATION: LOWER

## 2024-05-03 ASSESSMENT — PAIN DESCRIPTION - DESCRIPTORS: DESCRIPTORS: PATIENT UNABLE TO DESCRIBE

## 2024-05-03 ASSESSMENT — PAIN DESCRIPTION - LOCATION: LOCATION: ABDOMEN

## 2024-05-03 NOTE — PLAN OF CARE
Problem: Safety - Adult  Goal: Free from fall injury  5/3/2024 0527 by Michelle Ulloa RN  Outcome: Progressing  5/2/2024 1852 by Margo Perez RN  Outcome: Progressing     Problem: Skin/Tissue Integrity  Goal: Absence of new skin breakdown  Description: 1.  Monitor for areas of redness and/or skin breakdown  2.  Assess vascular access sites hourly  3.  Every 4-6 hours minimum:  Change oxygen saturation probe site  4.  Every 4-6 hours:  If on nasal continuous positive airway pressure, respiratory therapy assess nares and determine need for appliance change or resting period.  5/3/2024 0527 by Michelle Ulloa RN  Outcome: Progressing  5/2/2024 1852 by Margo Perez RN  Outcome: Progressing     Problem: ABCDS Injury Assessment  Goal: Absence of physical injury  5/3/2024 0527 by Michelle Ulloa RN  Outcome: Progressing  5/2/2024 1852 by Margo Perez RN  Outcome: Progressing

## 2024-05-03 NOTE — PROGRESS NOTES
Gynecology Oncology Progress Note      Zaira Huizar is a 68 y.o. female HD#2, POD#0 who transferred from [place] due to concern for vaginal mass    Patient seen and examined. Communicated via white board. Pain is controlled. She is urinating in a hat. She is  ambulating.      Patient has not asked for pain meds or indicated she was in pain.     Vitals:  Vitals:    05/03/24 1845 05/03/24 1900 05/03/24 1926 05/03/24 2331   BP: 136/72 132/80 (!) 161/81 103/79   Pulse: 73 69 87 72   Resp: 15 17 20 18   Temp:  97.2 °F (36.2 °C) 97.3 °F (36.3 °C) 97.5 °F (36.4 °C)   TempSrc:  Temporal Axillary Axillary   SpO2: 94% 93% 94% 96%   Weight:       Height:             Intake/Output:   Last Shift: I/O last 3 completed shifts:  In: 2970.4 [P.O.:610; I.V.:2330.4; IV Piggyback:30.1]  Out: 1720 [Urine:1700; Blood:20]  Current Shift: I/O this shift:  In: -   Out: 950 [Urine:950]    950 mL in the last 7 hrs of urine 135 mL/hr    Physical Exam:  General: Alert and oriented, no acute distress  HEENT: normocephalic, atraumatic, supple, symmetrical, trachea midline, Pupils equal and reactive to light, Extraocular muscles intact, sclera non icteric  Respiratory: Unlabored respirations, no wheezes or rhonchi  Cardiovascular: Regular rate and rhythm  Abdomen:  soft, non tender  Extremities: No LE edema, no calf tenderness or swelling  Psych: affect appropriate  Pelvic Exam: deferred, scant blood noted in diaper    Lab:  Recent Results (from the past 24 hour(s))   POC Glucose Fingerstick    Collection Time: 05/03/24  2:14 AM   Result Value Ref Range    POC Glucose 88 65 - 105 mg/dL   POC Glucose Fingerstick    Collection Time: 05/03/24  8:01 AM   Result Value Ref Range    POC Glucose 83 65 - 105 mg/dL   CBC with Auto Differential    Collection Time: 05/03/24  8:49 AM   Result Value Ref Range    WBC 6.8 3.5 - 11.3 k/uL    RBC 3.29 (L) 3.95 - 5.11 m/uL    Hemoglobin 9.0 (L) 11.9 - 15.1 g/dL    Hematocrit 31.6 (L) 36.3 - 47.1 %    MCV 96.0 82.6  control: tylenol PRN   - Labs: if sx   - DVT Proph: SCDs   - Abx: n/a   - Diet: Adult   - Encourage ambulation    - Pathology pending    Vaginal Bleeding   - Patient originally transferred 2/2 concern for vaginal mass and bleeding   - At OSH, differentials included anal vs vaginal bleeding as it was difficult to discern at the time   - Mild to scant bleeding noted in patient's diaper this morning   - Labs:    - Hgb 6.5 at OSH > 2u pRBC > 8.9 > 8.7 > 9   - MRI 5/2: A 5.9 x 3.8 cm enhancing mass within the cervix extending into the lower uterine segment is highly suspicious for cervical cancer and there is extension into the bilateral parametria with apparent invasion of the bladder wall and suspected mild extension into the upper vagina. Uterus appears to be septate and both endometrium moieties are distended with complicated fluid, likely due to obstruction from the cervical mass. Dilated right distal ureter may be due to obstruction from bladder wall implant. External iliac lymphadenopathy, left greater than right, is likely metastatic.    - Patient underwent Operative Hysteroscopy, Cervix Biopsy, Cystoscopy, Excision of skin mole which demonstrated significant disease process  - Pathology sent to biopsy  - Thorough conversation had with POA/son, Iggy, regarding surgical findings  - Will continue to monitor patient closely     Poor Speech with Deafness              - Discussed with nursing at [facility] and patient's son, Iggy              - Per nursing, patient is alert and oriented however able to communicate mainly through written messages. Discussed concerns for AMS with nurse and Iggy and they state at time, depending on her mood, given her extensive psych history, she will purposefully ignore conversation and sometimes gives inconsistent, difficult to comprehend answers              - Iggy states there is no stroke history   - CT head 5/2: wnl   - Able to use written communication with white board    Heart  - Continue to monitor      Protein Deficient Malnutrition              - Albumin noted to be 2.1 on admission              - Will supplement with Boost/Ensure when not NPO     Hydronephrosis with Hydroureter              - Seen on CT/AP 11/27/23     Hx Appendectomy    Hx C/S    BMI 33    Principal Problem:    Vaginal mass  Resolved Problems:    * No resolved hospital problems. *      Attending: Dr. Rocha      Please page the resident named below with questions and concerns.    Emily August MD  OBGYN Resident  5/4/2024, 1:25 AM

## 2024-05-03 NOTE — PROGRESS NOTES
Gynecology Oncology Progress Note      Zaira Huizar is a 68 y.o. female HD#2 transferred from [place] due to concern for vaginal mass    Patient seen and examined. Communicated via white board. Pain is controlled. Patient is  tolerating oral intake and has been NPO since midnight. She is urinating in a hat. She reports any vaginal bleeding. She is  ambulating.  She had a bowel movement yesterday.     Discussed with nursing overnight, state that her urine was blood tinged with some clots, likely from a vaginal origin.    Discussed MRI findings with patient. She was tearful but understanding. Discussed possible surgery later this afternoon or tomorrow morning.    Vitals:  Vitals:    05/02/24 1520 05/02/24 1941 05/03/24 0500 05/03/24 0737   BP: (!) 104/59 (!) 106/51 107/64 131/61   Pulse: 79 90 74 71   Resp: 16 17 16 16   Temp: 98.9 °F (37.2 °C) 99 °F (37.2 °C) 98.8 °F (37.1 °C) 98.6 °F (37 °C)   TempSrc: Oral Oral Oral Oral   SpO2: 96% 96% 96% 100%   Weight:       Height:             Intake/Output:   Last Shift: I/O last 3 completed shifts:  In: 1591 [P.O.:610; I.V.:950.9; IV Piggyback:30.1]  Out: 2050 [Urine:2050]  Current Shift: No intake/output data recorded.    1450 mL in the last 12 hrs of blood tinged urine with some clots (likely from a vaginal origin) ~120mL/hr    Physical Exam:  General: Alert and oriented, no acute distress  HEENT: normocephalic, atraumatic, supple, symmetrical, trachea midline, Pupils equal and reactive to light, Extraocular muscles intact, sclera non icteric  Respiratory: Unlabored respirations, no wheezes or rhonchi  Cardiovascular: Regular rate and rhythm  Abdomen:  soft, non tender  Extremities: No LE edema, no calf tenderness or swelling  Psych: affect appropriate  Pelvic Exam: deferred, scant blood noted in diaper    Lab:  Recent Results (from the past 24 hour(s))   POC Glucose Fingerstick    Collection Time: 05/02/24 12:21 PM   Result Value Ref Range    POC Glucose 89 65 - 105 mg/dL        - Continue to monitor insulin requirement      Schizophrenia              - Per nursing at [facility] and patient's son, patient has significant PMH of schizophrenia               - Home meds include Topomax and Lumaterprone (not on formulary at [facility]. Consulted with IP pharmacy and they recommended patient's son bring in and pharmacy verifies).              - Can consider psychiatry consult if needed              - Continue to monitor       Asthma/COPD              - VSS, no conversational dyspnea noted              - Albuterol/Ipatropium ordered PRN per inpatient protocol              - Continue to monitor      HTN              - BP overall stable at this time              - Sacubitril/Valsaratan ordered with parameters to hold if Systolic <120 or HR <60              - Continue to monitor      Protein Deficient Malnutrition              - Albumin noted to be 2.1 on admission              - Will supplement with Boost/Ensure when not NPO     Hydronephrosis with Hydroureter              - Seen on CT/AP 11/27/23     Hx Appendectomy    Hx C/S    BMI 33    Principal Problem:    Vaginal mass  Resolved Problems:    * No resolved hospital problems. *      Attending: Dr. Rocha      Please page the resident named below with questions and concerns.    Emily August MD  OBGYN Resident  5/3/2024, 8:30 AM       Attending Physician Statement  I have discussed the care of Zaira Huizar, including pertinent history and exam findings, with the resident. I have seen and examined the patient and the key elements of all parts of the encounter have been performed by me. I agree with the assessment, plan and orders as documented by the resident.     The patient and her family have been counseled regarding her abnormal symptoms, physical exam findings and imaging results. I have reviewed the MRI report with the patient's son today. The areas of concern have been highlighted. A sara and thorough discussion was held with

## 2024-05-03 NOTE — BRIEF OP NOTE
Brief Operative Note  Department of Obstetrics and Gynecology  [facility]     Patient: Zaira Huizar   : 1955  MRN: [id number]       Acct: [id number]   Date of Procedure: 5/3/24     Pre-operative Diagnosis: 68 y.o. female    Cervical Mass  Postmenopausal Bleeding  Anemia  Thickened Endometriums   Didelphis Uterus  Hypertension  Heart Failure with Preserved Ejection Fraction  Poor speech with Deafness  Diabetes  Schizophrenia  Chronic Obstructive Pulmonary Disease  Asthma  History of  Section  BMI 33    Post-operative Diagnosis: 68 y.o. female    Cervical Mass  Septate Vagina  Postmenopausal Bleeding  Anemia  Thickened Endometriums   Didelphis Uterus  Hypertension  Heart Failure with Preserved Ejection Fraction  Poor speech with Deafness  Diabetes  Schizophrenia  Chronic Obstructive Pulmonary Disease  Asthma  History of  Section  BMI 33    Procedure: Operative Hysteroscopy, Cervix Biopsy, Cystoscopy, Excision of skin mole    Surgeon: Dr. Fitzgerald     Assistant(s): Emily August MD, PGY2    Anesthesia: general via ET tube    Findings:  Normal appearing external genitalia. Persistent trickle of blood noted from vagina. Abnormal appearing cervix with evidence of tumor. On bimanual exam copious tumor felt over cervix, right parametrium, and affixed to pelvic side wall. Vaginal Septum noted. Hysteroscopy of vaginal septum reveals evidence of tumor. Rectal exam, smooth, no palpable tumor. Cystoscopy reveled possible tumor mass effect, no evidence of tumor in bladder. 1x1 cm irregular appearing mole noted on left side of mons.  Total IV fluids/Blood products:  700 ml crystalloid  Urine Output:  UOP not recorded as cystoscopy was performed    Estimated blood loss:  20 mL  Drains:  none  Specimens:  left mons mole, left cervical biopsy, right cervical and vaginal biopsy  Instrument and Sponge Count: Correct  Complications:  none  Condition:  stable, transferred to post anesthesia

## 2024-05-03 NOTE — CARE COORDINATION
Spoke to Avirl at [facility].  Provided updates.  I asked if the patient will need SNF upon return if they are affiliated with one.  Avril tells me that they have [facility] on the same campus.    I Provide CM number if any they have any questions.

## 2024-05-03 NOTE — ANESTHESIA PRE PROCEDURE
Department of Anesthesiology  Preprocedure Note       Name:  Zaira Huizar   Age:  68 y.o.  :  1955                                          MRN:  [id number]         Date:  5/3/2024      Surgeon: Surgeon(s):  Sincere Rocha MD    Procedure: Procedure(s):  DILATATION AND CURETTAGE HYSTEROSCOPY POSSIBLE BIOPSY, POSSIBLE CYSTOSCOPY, POSSIBLE ANOSCOPY    Medications prior to admission:   Prior to Admission medications    Not on File       Current medications:    Current Facility-Administered Medications   Medication Dose Route Frequency Provider Last Rate Last Admin    Lumateperone Tosylate (CAPLYTA) capsule 42 mg (Patient Supplied)  42 mg Oral Nightly Emily August MD        0.9 % sodium chloride infusion   IntraVENous Continuous Juan Diego Bonilla MD 75 mL/hr at 24 Rate Verify at 24    sodium chloride flush 0.9 % injection 5-40 mL  5-40 mL IntraVENous PRN Juan Diego Bonilla MD   10 mL at 24    0.9 % sodium chloride infusion   IntraVENous PRN Juan Diego Bonilla MD        ondansetron (ZOFRAN-ODT) disintegrating tablet 4 mg  4 mg Oral Q8H PRN Juan Diego Bonilla MD        Or    ondansetron (ZOFRAN) injection 4 mg  4 mg IntraVENous Q6H PRN Juan Dieog Bonilla MD        insulin lispro (HUMALOG) injection vial 0-10 Units  0-10 Units SubCUTAneous TID WC Juan Diego Bonilla MD        glucose chewable tablet 16 g  4 tablet Oral PRN Juan Diego Bonilla MD        dextrose bolus 10% 125 mL  125 mL IntraVENous PRN Juan Diego Bonilla MD        Or    dextrose bolus 10% 250 mL  250 mL IntraVENous PRN Juan Diego Bonilla MD        glucagon injection 1 mg  1 mg SubCUTAneous PRN Juan Diego Bonilla MD        dextrose 10 % infusion   IntraVENous Continuous PRN Juan Diego Bonilla MD        acarbose (PRECOSE) tablet 100 mg  100 mg Oral BID Emily August MD   100 mg at 24    acetaminophen (TYLENOL) tablet 1,000 mg  1,000 mg Oral Q6H PRN Emily August MD        albuterol sulfate HFA (PROVENTIL;VENTOLIN;PROAIR) 108 (90

## 2024-05-04 VITALS
WEIGHT: 194.22 LBS | HEIGHT: 64 IN | DIASTOLIC BLOOD PRESSURE: 58 MMHG | SYSTOLIC BLOOD PRESSURE: 116 MMHG | HEART RATE: 74 BPM | RESPIRATION RATE: 18 BRPM | BODY MASS INDEX: 33.16 KG/M2 | OXYGEN SATURATION: 96 % | TEMPERATURE: 97.9 F

## 2024-05-04 LAB
GLUCOSE BLD-MCNC: 124 MG/DL (ref 65–105)
GLUCOSE BLD-MCNC: 87 MG/DL (ref 65–105)

## 2024-05-04 PROCEDURE — 82947 ASSAY GLUCOSE BLOOD QUANT: CPT

## 2024-05-04 PROCEDURE — 6370000000 HC RX 637 (ALT 250 FOR IP)

## 2024-05-04 RX ADMIN — SACUBITRIL AND VALSARTAN 1 TABLET: 24; 26 TABLET, FILM COATED ORAL at 08:05

## 2024-05-04 RX ADMIN — SENNOSIDES 17.2 MG: 8.6 TABLET, FILM COATED ORAL at 08:04

## 2024-05-04 RX ADMIN — ACARBOSE 100 MG: 50 TABLET ORAL at 08:05

## 2024-05-04 RX ADMIN — TOPIRAMATE 25 MG: 25 TABLET, FILM COATED ORAL at 08:05

## 2024-05-04 NOTE — CARE COORDINATION
Zaira  from [facility] calls , informs cm son picked up pts caplydaryl and brought to hosp, please make sure it is returned with pt.

## 2024-05-04 NOTE — ANESTHESIA POSTPROCEDURE EVALUATION
Department of Anesthesiology  Postprocedure Note    Patient: Zaira Huizar  MRN: [id number]  YOB: 1955  Date of evaluation: 5/4/2024    Procedure Summary       Date: 05/03/24 Room / Location: [address]    Anesthesia Start: 1600 Anesthesia Stop: 1759    Procedure: OPERATIVE HYSTEROSCOPY, CERVIX BIOPSY, CYSTOSCOPY, EXCISION SKIN MOLE (Vagina ) Diagnosis:       Bleeding from the genitourinary system      (Bleeding from the genitourinary system [R31.9])    Surgeons: Sincere Rocha MD Responsible Provider: Sandip Ayon MD    Anesthesia Type: general ASA Status: 2            Anesthesia Type: No value filed.    Johanna Phase I: Johanna Score: 10    Johanna Phase II:      Anesthesia Post Evaluation    Patient location during evaluation: PACU  Patient participation: complete - patient participated  Level of consciousness: awake  Airway patency: patent  Nausea & Vomiting: no nausea and no vomiting  Cardiovascular status: blood pressure returned to baseline  Respiratory status: acceptable  Hydration status: euvolemic  Comments: /79   Pulse 72   Temp 97.5 °F (36.4 °C) (Axillary)   Resp 18   Ht 1.626 m (5' 4\")   Wt 88.1 kg (194 lb 3.6 oz)   SpO2 96%   BMI 33.34 kg/m²   Pain Assessment: 0-10 Pain Level: 4    No notable events documented.

## 2024-05-04 NOTE — PLAN OF CARE
Problem: Safety - Adult  Goal: Free from fall injury  5/4/2024 1048 by Taryn Martinez RN  Outcome: Adequate for Discharge  5/4/2024 0357 by Chang Gann RN  Outcome: Progressing     Problem: Skin/Tissue Integrity  Goal: Absence of new skin breakdown  Description: 1.  Monitor for areas of redness and/or skin breakdown  2.  Assess vascular access sites hourly  3.  Every 4-6 hours minimum:  Change oxygen saturation probe site  4.  Every 4-6 hours:  If on nasal continuous positive airway pressure, respiratory therapy assess nares and determine need for appliance change or resting period.  5/4/2024 1048 by Taryn Martinez RN  Outcome: Adequate for Discharge  5/4/2024 0357 by Chang Gann RN  Outcome: Progressing     Problem: ABCDS Injury Assessment  Goal: Absence of physical injury  5/4/2024 1048 by Taryn Martinez RN  Outcome: Adequate for Discharge  5/4/2024 0357 by Chang Gann RN  Outcome: Progressing     Problem: Discharge Planning  Goal: Discharge to home or other facility with appropriate resources  5/4/2024 1048 by Taryn Martinez RN  Outcome: Adequate for Discharge  5/4/2024 0357 by Chang Gann RN  Outcome: Progressing     Problem: Pain  Goal: Verbalizes/displays adequate comfort level or baseline comfort level  5/4/2024 1048 by Taryn Martinez RN  Outcome: Adequate for Discharge  5/4/2024 0357 by Chang Gann RN  Outcome: Progressing

## 2024-05-04 NOTE — PROGRESS NOTES
Gynecology Oncology Progress Note      Zaira Huizar is a 68 y.o. female HD#3, POD#1 who transferred from [place] due to concern for vaginal mass    Patient seen and examined. Communicated via white board. Pain is controlled. She is urinating in a hat. She is  ambulating.      Patient has not asked for pain meds or indicated she was in pain. She appeared happy to see medical staff this morning.    Vitals:  Vitals:    05/03/24 1900 05/03/24 1926 05/03/24 2331 05/04/24 0743   BP: 132/80 (!) 161/81 103/79 122/61   Pulse: 69 87 72 57   Resp: 17 20 18 18   Temp: 97.2 °F (36.2 °C) 97.3 °F (36.3 °C) 97.5 °F (36.4 °C) 98.4 °F (36.9 °C)   TempSrc: Temporal Axillary Axillary Oral   SpO2: 93% 94% 96% 96%   Weight:       Height:             Intake/Output:   Last Shift: I/O last 3 completed shifts:  In: 3547 [P.O.:450; I.V.:3066.9; IV Piggyback:30.1]  Out: 3220 [Urine:3200; Blood:20]  Current Shift: I/O this shift:  In: -   Out: 175 [Urine:175]    1500 mL in the last 7 hrs of urine 125 mL/hr    Physical Exam:  General: Alert and oriented, no acute distress  HEENT: normocephalic, atraumatic, supple, symmetrical, trachea midline, Pupils equal and reactive to light, Extraocular muscles intact, sclera non icteric  Respiratory: Unlabored respirations, no wheezes or rhonchi  Cardiovascular: Regular rate and rhythm  Abdomen:  soft, non tender  Extremities: No LE edema, no calf tenderness or swelling  Psych: affect appropriate  Pelvic Exam: deferred, scant blood noted in diaper    Lab:  Recent Results (from the past 24 hour(s))   CBC with Auto Differential    Collection Time: 05/03/24  8:49 AM   Result Value Ref Range    WBC 6.8 3.5 - 11.3 k/uL    RBC 3.29 (L) 3.95 - 5.11 m/uL    Hemoglobin 9.0 (L) 11.9 - 15.1 g/dL    Hematocrit 31.6 (L) 36.3 - 47.1 %    MCV 96.0 82.6 - 102.9 fL    MCH 27.4 25.2 - 33.5 pg    MCHC 28.5 28.4 - 34.8 g/dL    RDW 16.1 (H) 11.8 - 14.4 %    Platelets 413 138 - 453 k/uL    MPV 8.6 8.1 - 13.5 fL    NRBC

## 2024-05-05 PROBLEM — R31.9: Status: ACTIVE | Noted: 2024-05-05

## 2024-05-05 PROBLEM — L81.9 PIGMENTED SKIN LESION OF UNCERTAIN NATURE: Status: ACTIVE | Noted: 2024-05-05

## 2024-05-05 PROBLEM — F20.9 SCHIZOPHRENIA (HCC): Status: ACTIVE | Noted: 2024-05-05

## 2024-05-05 PROBLEM — H91.93 BILATERAL HEARING LOSS: Status: ACTIVE | Noted: 2024-05-05

## 2024-05-05 PROBLEM — Q51.28 UTERUS DIDELPHYS: Status: ACTIVE | Noted: 2024-05-05

## 2024-05-05 PROBLEM — N88.8 MASS OF CERVIX: Status: ACTIVE | Noted: 2024-05-05

## 2024-05-05 PROBLEM — E66.9 OBESITY, CLASS I, BMI 30-34.9: Status: ACTIVE | Noted: 2024-05-05

## 2024-05-05 PROBLEM — N28.82 RIGHT URETER DILATED: Status: ACTIVE | Noted: 2024-05-05

## 2024-05-05 PROBLEM — R93.89 THICKENED ENDOMETRIUM: Status: ACTIVE | Noted: 2024-05-05

## 2024-05-05 PROBLEM — R59.0 LYMPHADENOPATHY, RETROPERITONEAL: Status: ACTIVE | Noted: 2024-05-05

## 2024-05-05 PROBLEM — D64.9 ANEMIA: Status: ACTIVE | Noted: 2024-05-05

## 2024-05-05 PROBLEM — K64.4 EXTERNAL HEMORRHOID, BLEEDING: Status: ACTIVE | Noted: 2024-05-05

## 2024-05-05 NOTE — OP NOTE
OPERATIVE NOTE  PATIENT: Zaira Huizar  YOB: 1955  MRN: [id number]  DATE OF PROCEDURE: 05/02/2024     PRE-OP DIAGNOSIS:   Genital-urinary bleeding  Skin lesion mons pubis  Anemia  Cervix mass  Didelphys uterus  Thickened endometrium  Dilated right distal ureter  Pelvic lymphadenopathy  Hearing loss  Schizophrenia  Class I obesity (BMI 33)    POST-OP DIAGNOSIS:   Genital-urinary bleeding  Skin lesion mons pubis  Inflamed external hemorrhoid  Anemia  Cervix mass  Didelphys uterus  Thickened endometrium  Dilated right distal ureter  Pelvic lymphadenopathy  Hearing loss  Schizophrenia  Class I obesity (BMI 33)  Pending final pathology       PROCEDURE:  Exam under anesthesia + Cystoscopy + Operative vaginoscopy + Cervix biopsy + Excision Skin Mole     SURGEON: Sincere Rocha MD     ASSISTANT:   Emily August MD     ANESTHESIA: General     ESTIMATED BLOOD LOSS (mL): 20cc     COMPLICATIONS: None noted at the end of the procedure     SPECIMENS:   ID Type Source Tests Collected by Time   A : LEFT CERVIX BIOPSY Tissue Cervix SURGICAL PATHOLOGY Sincere Rocha MD 5/3/2024 1725   B : RIGHT VAGINAL CERVIX BIOPSY Tissue Cervix SURGICAL PATHOLOGY Sincere Rocha MD 5/3/2024 1726   C : LEFT MONS SKIN LESION Tissue Skin SURGICAL PATHOLOGY Sincere Rocha MD 5/3/2024 1733      INDICATIONS:  69yo with symptoms of genital-urinary bleeding. She was transferred from [facility] after an Exam under anesthesia and attempted Dilation & Curettage (05/01/24), by Dr. Janusz Ruiz. The patient was treated with blood transfusion due to Hemoglobin 6.5 (04/30/24), with associated hypotension. She was resuscitated and transferred to [facility] for higher level of care. Hemoglobin and blood pressure have since improved. MRI Pelvis demonstrates findings of a 6cm cervix mass, didelphis uterus, with thickened endometrial stripe and pelvic lymphadenopathy (05/02/24). Recommendations were made to proceed with surgical evaluation. She  has been advised of the potential risks, benefits and alternatives to the procedure. The patient is unable to make her own decision. Informed consent was obtained with her son (Iggy Huizar, health care power of ).      FINDINGS:   Normal appearing vulva. External hemorrhoids noted, with one in particular inflamed and beefy red. This is the likely source of rectal bleeding. Smooth recto-vaginal septum. Extensive nodularity noted in the posterior cul-de-sac on recto-vaginal exam. On bimanual exam, the immobile cervix mass was palpated, with bilateral parametrial involvement. The tumor along the right parametria extended and was noted to be fixed to the right pelvic sidewall. On speculum exam, a vaginal septum is noted (RIGHT vaginal canal much small than LEFT vaginal canal). Narrow right vaginal canal allowed digital palpation and vaginoscopy alone. Majority of vaginal bleeding originated from the left vaginal canal. On vaginoscopy, the cervix was noted to be completely replaced by tumor, with a dilated ectocervix. Given the distorted anatomy and extensive tumor in this area, biopsies of the ectocervix obtained alone. The AVETA scope was not advanced into the endometrial canal. On examination of the LEFT vaginal canal, a stenotic cervix flush with the vagina was noted to be also replaced with tumor. Cervix biopsies were obtained using a Tischler biopsy forceps. Once more, given the distorted anatomy and extensive tumor in this area, biopsies of the ectocervix obtained alone. On cytoscopy, the bladder mucosa was noted to be smooth and without tumor involvement. Extensive extrinsic compression from the cervix mass was noted against the bladder trigone. A 0.5cm irregular shaped, discolored nevus was noted on the LEFT mons pubis. Superficial skin separation of the most inferior portion of the vertical midline incision was noted.     DETAILED DESCRIPTION OF PROCEDURE:   The patient was identified and transported

## 2024-05-06 ENCOUNTER — TELEPHONE (OUTPATIENT)
Dept: GYNECOLOGIC ONCOLOGY | Age: 69
End: 2024-05-06

## 2024-05-06 DIAGNOSIS — C53.9 MALIGNANT NEOPLASM OF CERVIX, UNSPECIFIED SITE (HCC): Primary | ICD-10-CM

## 2024-05-06 NOTE — TELEPHONE ENCOUNTER
Called [facility] and spoke with nurse Martha.  Informed nurse of [facility] referral for medical oncology.  Contact information provided for referral follow up.  Martha voiced understanding and appreciation.

## 2024-05-06 NOTE — TELEPHONE ENCOUNTER
Called and spoke with Iggy regarding medical oncology referral preference.  Iggy informed writer that [facility] in [place] referral is preferred.  Referral to be sent.  Iggy inquired as to where FMLA is to be sent.  Writer informed gIgy that he is more than welcome to get FMLA sent to this office, but would also make sure a copy be sent to [facility] medical oncology as well.  Iggy voiced understanding and appreciation.

## 2024-05-09 LAB — SURGICAL PATHOLOGY REPORT: NORMAL

## 2024-05-10 ENCOUNTER — TELEPHONE (OUTPATIENT)
Dept: GYNECOLOGIC ONCOLOGY | Age: 69
End: 2024-05-10

## 2024-05-10 NOTE — TELEPHONE ENCOUNTER
Called son to inquire on [facility] appt.  No answer, left voicemail.  Called [facility], appt scheduled for 6/19/24.  Surgeon updated.  Appointment needs to be sooner for patient.  Pathology faxed to [facility] and requested sooner appointment.

## 2024-05-11 ENCOUNTER — TELEPHONE (OUTPATIENT)
Dept: GYNECOLOGIC ONCOLOGY | Age: 69
End: 2024-05-11

## 2024-05-11 DIAGNOSIS — C53.9 MALIGNANT NEOPLASM OF CERVIX, UNSPECIFIED SITE (HCC): Primary | ICD-10-CM

## 2024-05-11 NOTE — TELEPHONE ENCOUNTER
[facility] Gynecologic Oncology  [address]  [place] [address]    I have informed the patient's son (Iggy, who is also the Health Care Power of . Documents to support this are located in the media section) regarding the final pathology report that is consistent with a Squamous Cell Carcinoma of the cervix. I have informed Iggy that clinically this is considered to be an at least Stage IIIB Cervix Cancer.    Given the evidence of an immobile cervix mass, with bilateral parametrial involvement and the tumor along the right parametria was noted to be fixed to the right pelvic sidewall, Iggy has been counseled regarding my primary treatment recommendation to include radiation therapy concurrent with weekly Cisplatin chemotherapy. A brief description of the chemotherapy regimen and the two phases of radiation treatment were reviewed today. The treatment schedule and associated side effects of each treatment were also reviewed. Iggy has been informed that his mother is not a surgical candidate given this at least locally advanced disease.  A discussion has been held regarding the importance of completing further evaluation with a PET/CT-scan (skull to mid thigh). In the event there is evidence of metastatic disease, the patient would be a candidate for palliative systemic chemotherapy.  Referrals to both medical oncology and radiation oncology have been previously placed. [facility] in [place] has given an appointment on 06/19/24, which is most definitely too far. Our office has left a message with their office to move this to a closer appointment. In the event that this visit is unable to be scheduled sooner, Iggy agrees to schedule an appointment with [facility] medical oncology. Radiation oncology visit will be scheduled with our affiliated center in [place].   Tempus NGS testing and PDL1 testing will be requested from the cervix biopsy (05/02/24)  In the event that the patient is

## 2024-05-13 ENCOUNTER — TELEPHONE (OUTPATIENT)
Dept: GYNECOLOGIC ONCOLOGY | Age: 69
End: 2024-05-13

## 2024-05-13 DIAGNOSIS — C53.9 MALIGNANT NEOPLASM OF CERVIX, UNSPECIFIED SITE (HCC): Primary | ICD-10-CM

## 2024-05-13 DIAGNOSIS — C53.9 SQUAMOUS CELL CARCINOMA OF CERVIX (HCC): Primary | ICD-10-CM

## 2024-05-13 NOTE — TELEPHONE ENCOUNTER
Called and scheduled patient for med onc at [facility] for 5/16/24.  Son Iggy notified.  Iggy to notify Alex Galvan.

## 2024-05-13 NOTE — TELEPHONE ENCOUNTER
Received call from Dr.Michele Moreno [facility] office in regards to scheduling patient for sooner appointment. She states she can get patient a sooner appointment on 5/30/24 but it would be at the [place] location [id number] [place] [address].She states she has multiple appointment times this day. Please call her back and let her know if you would like to schedule on this day.

## 2024-05-14 ENCOUNTER — TELEPHONE (OUTPATIENT)
Dept: GYNECOLOGIC ONCOLOGY | Age: 69
End: 2024-05-14

## 2024-05-14 DIAGNOSIS — C53.9 MALIGNANT NEOPLASM OF CERVIX, UNSPECIFIED SITE (HCC): Primary | ICD-10-CM

## 2024-05-14 PROBLEM — C53.8 MALIGNANT NEOPLASM OF OVERLAPPING SITES OF CERVIX UTERI (HCC): Status: ACTIVE | Noted: 2024-05-14

## 2024-05-14 NOTE — TELEPHONE ENCOUNTER
Called and spoke to son regarding TEMPUS.  Financial assistance form completed and patient will owe $0 out of pocket.  Patient scheduled for med onc appointment on 5/16/24, will request patient get TEMPUS drawn at this appointment.  Son voiced understanding and appreciation.

## 2024-05-14 NOTE — TELEPHONE ENCOUNTER
Spoke with Valentina Melendez Infusion Charge Nurse. Patient to get Tempus drawn on Thursday at med onc appt.

## 2024-05-16 ENCOUNTER — TELEPHONE (OUTPATIENT)
Dept: ONCOLOGY | Age: 69
End: 2024-05-16

## 2024-05-16 ENCOUNTER — OFFICE VISIT (OUTPATIENT)
Dept: ONCOLOGY | Age: 69
End: 2024-05-16
Payer: MEDICARE

## 2024-05-16 VITALS
BODY MASS INDEX: 32.44 KG/M2 | WEIGHT: 190 LBS | TEMPERATURE: 97.6 F | HEIGHT: 64 IN | DIASTOLIC BLOOD PRESSURE: 74 MMHG | RESPIRATION RATE: 18 BRPM | SYSTOLIC BLOOD PRESSURE: 126 MMHG | HEART RATE: 75 BPM

## 2024-05-16 DIAGNOSIS — C53.8 MALIGNANT NEOPLASM OF OVERLAPPING SITES OF CERVIX UTERI (HCC): Primary | ICD-10-CM

## 2024-05-16 DIAGNOSIS — H91.93 BILATERAL HEARING LOSS, UNSPECIFIED HEARING LOSS TYPE: ICD-10-CM

## 2024-05-16 DIAGNOSIS — C53.9 MALIGNANT NEOPLASM OF CERVIX, UNSPECIFIED SITE (HCC): ICD-10-CM

## 2024-05-16 DIAGNOSIS — N93.9 VAGINAL BLEEDING: ICD-10-CM

## 2024-05-16 PROCEDURE — 99202 OFFICE O/P NEW SF 15 MIN: CPT | Performed by: INTERNAL MEDICINE

## 2024-05-16 RX ORDER — TOPIRAMATE 25 MG/1
TABLET ORAL
COMMUNITY
Start: 2023-10-29

## 2024-05-16 RX ORDER — MONTELUKAST SODIUM 10 MG/1
TABLET ORAL
COMMUNITY
Start: 2023-11-11

## 2024-05-16 RX ORDER — FUROSEMIDE 20 MG/1
20 TABLET ORAL DAILY
COMMUNITY
Start: 2024-03-15 | End: 2025-05-13

## 2024-05-16 RX ORDER — ACETAMINOPHEN 500 MG
500 TABLET ORAL EVERY 8 HOURS PRN
COMMUNITY
Start: 2023-11-10

## 2024-05-16 RX ORDER — ALUMINUM HYDROXIDE, MAGNESIUM HYDROXIDE, DIMETHICONE 200; 200; 20 MG/5ML; MG/5ML; MG/5ML
LIQUID ORAL
COMMUNITY
Start: 2024-03-23

## 2024-05-16 RX ORDER — SACUBITRIL AND VALSARTAN 24; 26 MG/1; MG/1
1 TABLET, FILM COATED ORAL 2 TIMES DAILY
COMMUNITY
Start: 2023-11-05

## 2024-05-16 RX ORDER — FERROUS SULFATE 325(65) MG
325 TABLET ORAL
COMMUNITY
Start: 2023-10-29

## 2024-05-16 RX ORDER — LUMATEPERONE 42 MG/1
42 CAPSULE ORAL DAILY
COMMUNITY
Start: 2023-10-22

## 2024-05-16 RX ORDER — ALBUTEROL SULFATE 90 UG/1
2 AEROSOL, METERED RESPIRATORY (INHALATION) EVERY 4 HOURS PRN
COMMUNITY
Start: 2023-10-13

## 2024-05-16 RX ORDER — FAMOTIDINE 40 MG/1
TABLET, FILM COATED ORAL
COMMUNITY
Start: 2023-10-15

## 2024-05-16 RX ORDER — LANOLIN ALCOHOL/MO/W.PET/CERES
1000 CREAM (GRAM) TOPICAL DAILY
COMMUNITY
Start: 2023-10-21

## 2024-05-16 RX ORDER — FLUTICASONE PROPIONATE 50 MCG
1 SPRAY, SUSPENSION (ML) NASAL DAILY
COMMUNITY

## 2024-05-16 RX ORDER — RANOLAZINE 500 MG/1
500 TABLET, EXTENDED RELEASE ORAL 2 TIMES DAILY
COMMUNITY
Start: 2023-10-10

## 2024-05-16 RX ORDER — TRAZODONE HYDROCHLORIDE 50 MG/1
TABLET ORAL
COMMUNITY
Start: 2023-10-29

## 2024-05-16 RX ORDER — ATORVASTATIN CALCIUM 40 MG/1
40 TABLET, FILM COATED ORAL NIGHTLY
COMMUNITY
Start: 2023-11-05

## 2024-05-16 RX ORDER — HYDROCORTISONE 25 MG/G
CREAM TOPICAL
COMMUNITY
Start: 2023-10-17

## 2024-05-16 RX ORDER — ACARBOSE 100 MG/1
100 TABLET ORAL 2 TIMES DAILY
COMMUNITY
Start: 2023-09-24

## 2024-05-16 RX ORDER — NITROGLYCERIN 400 UG/1
1 SPRAY ORAL EVERY 5 MIN PRN
COMMUNITY

## 2024-05-16 NOTE — PROGRESS NOTES
obstruction from the cervical mass.  3. Dilated right distal ureter may be due to obstruction from bladder wall  implant.  4. External iliac lymphadenopathy, left greater than right, is likely  metastatic.    Above compatible with at least stage IIIb cervical cancer and was evaluated by GYN oncology and due to the fact fixed cervix mass, with bilateral parametrial involvement and the tumor along the right parametria was noted to be fixed to the right pelvic sidewall and decision was to proceed with concurrent chemo RT with weekly cisplatin and radiation also pending a PET/CT  Tempus NGS and PD-L1 status from the cervix biopsy still pending      No past medical history on file.    Past Surgical History:   Procedure Laterality Date    DILATION AND CURETTAGE OF UTERUS  05/03/2024    w/ HYSTEROSCOPY BIOPSY, CYSTOSCOPY, ANOSCOPY    DILATION AND CURETTAGE OF UTERUS N/A 5/3/2024    OPERATIVE HYSTEROSCOPY, CERVIX BIOPSY, CYSTOSCOPY, EXCISION SKIN MOLE performed by Sincere Rocha MD at [facility] OR       No Known Allergies    Current Outpatient Medications   Medication Sig Dispense Refill    acarbose (PRECOSE) 100 MG tablet Take 1 tablet by mouth 2 times daily      acetaminophen (TYLENOL) 500 MG tablet Take 1 tablet by mouth every 8 hours as needed      Aspirin 81 MG CAPS Take 81 mg by mouth daily      atorvastatin (LIPITOR) 40 MG tablet Take 1 tablet by mouth nightly      CAPLYTA 42 MG capsule Take 1 capsule by mouth daily      ENTRESTO 24-26 MG per tablet Take 1 tablet by mouth 2 times daily      famotidine (PEPCID) 40 MG tablet Refills(s) 0      ferrous sulfate (IRON 325) 325 (65 Fe) MG tablet Take 1 tablet by mouth daily (with breakfast)      furosemide (LASIX) 20 MG tablet Take 1 tablet by mouth daily      metFORMIN (GLUCOPHAGE) 1000 MG tablet Take 1 tablet by mouth 2 times daily (with meals)      montelukast (SINGULAIR) 10 MG tablet Refills(s) 0      ranolazine (RANEXA) 500 MG extended release tablet Take 1 tablet by

## 2024-05-16 NOTE — TELEPHONE ENCOUNTER
Name: Zaira Huizar  : 1955  MRN: [id number]    Oncology Navigation- Initial Note:    Intake-  Contact Type: Medical Oncology    Continuum of Care: Diagnosis/Active Treatment    Smoking hx: none    Notes: Pt discussed with Dr. Villegas. Pt to see RO ASAP and to have concurrent Cisplatin and RT.  Navigator met with Zaira and family (Son Iggy and daughter in law?)   Zaira lives at [facility] in [place] and has medicare A & B coverage.  Iggy states that [facility] provides transpiration but he would like to have FMLA to be able to be at appointments.  Iggy given business card with fax number.  Informed him that FMLA paperwork can be faxed to the office. Understanding verbalized.      Iggy denies any  social work needs or dietary concerns.    Pt to have Echo and PET scan.     Call made to LAQUITA Strickland.  Spoke to KATIANA Gong.  Appointment with Dr. Booker scheduled for 24 @ 0900.  Records to be faxed to [phone].  Images pushed.      Will continue to follow.     Electronically signed by Petrona Vincent RN on 2024 at 3:48 PM

## 2024-05-16 NOTE — ADDENDUM NOTE
Addended by: TIFFANI LEZAMA on: 5/16/2024 05:07 PM     Modules accepted: Orders     [facility] [place]    Discharge Summary  Pediatric Gastroenterology    Date of Admission:  4/10/2018  Date of Discharge:  4/26/2018 10:49 AM  Discharging Provider: Leidy Pike    Discharge Diagnoses   Patient Active Problem List   Diagnosis     Intestinal failure     Hirschsprung's disease     Short bowel syndrome     Ileostomy prolapse (H)     Gastrostomy tube dependent (H)     Elevated transaminase level     Bacteremia     History of Present Illness       [place] Cai is a 6 month old female with a history of intestinal failure in the context of 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     Washington initially presented to [facility] due to fussiness and a fever while at  on 4/6/18.  She had also been having increased ostomy output and a few episodes of vomiting.  At that time, workup at the outside facility was significant for a positive blood culture but normal CBC.  She was admitted on empiric Zosyn and vancomycin, and after cultures returned Enterococcus sensitive to vancomycin and Enterobacter sensitive to meropenem, the Zosyn was switched to meropenem.  After admission at [facility] she remained afebrile and generally well-appearing with decreased ostomy output.  However, she remained bacteremic for four days and the decision was made to transfer her to [facility] for further evaluation and management of her bacteremia and line infection.     Summary of blood culture results from [facility]  4/6: central line x 2 (lumen not specified):   -Enterococcus faecalis (sensitive to amp, gent high level synergy, streptomycin high level synergy, vancomycin)  -Enterobacter cloacae (Resistant to amp,  aztreonam, cefazolin, ceftazidime, ceftriaxone, Intermediate to zosyn, sensitive to meropenem, cefepime, cirpofloxacin, ertamenem, gentamicin, tobramycin, trimthoprim/sulfamethoxazole)  4/7: lumen 1: Enterococcus faecalis, lumen 2: Enterobacter cloacae, peripheral: Enterococcus faecalis  4/8: lumen 1: E. faecalis lumen 2: E. Cloacae and E. faecalis  4/9: lumen 1: GPC in pairs (Enterococcus on NAD), lumen 2: GPC in pairs, peripheral: in process  4/10: lumen 1: in process, lumen 2: in process, peripheral: in process at time of discharge    Hospital Course   [place] Cai was admitted on 4/10/2018.  The following problems were addressed during her hospitalization:    Central line-associated blood stream infection  [place] was originally admitted to the hospital in the [facility] with bacteremia thought to be related to her double lumen central line, with one lumen consistently growing Enterobacter and one consistently growing Enterococcus on daily blood cultures from 4/6 to 4/9. On arrival at our facility she was well-appearing and afebrile. She remained afebrile during admission. She was continued on IV Meropenem and Vancomycin during her admission and will complete a 14 day course of these antibiotics post her first negative blood culture on 04/10. Additionally her central line lumens were ethanol locked in alternating fashion Q48 hours to encourage clearance of the line infection. The line was not replaced during this admission in order to preserve vascular access given her high likelihood of needing long term central access. Washington was discharged off antibiotics. Q48 hour ethanol locks to prophylax against future line infections were discontinued at discharge but should be re-considered in the future if additional line infections occur.    Intestinal failure, short bowel syndrome  Prior to admission and this acute illnesses Washington was tolerating G tube feeds at 16ml/hr with 4oz of green beans + 14 hours of  TPN + minimal oral feeds. On admission she was tolerating feeds at 12ml/hr of elecare via G tube and no oral feeds. Was made NPO for ostomy revision procedure and feeds were restarted on POD6 and advanced as tolerated. Prior to discharge Washington was tolerating continuous feeds at 10ml/hr with 2oz of green beans via G tube.  No oral feeds were given during this admission.  Fax number for TPN order: [facility] - [phone]    Prolapse of ostomy   Ostomy prolapse revised by Dr. Trujillo on 4/13. Ostomy site was noted to have recurrent prolapse on POD1.  Prolapse was managed with intermittent reduction and close observation for integrity.  Her ostomy site remained intact and had good stool output throughout the postop process, despite prolapse.    Iron deficiency anemia  Hemoglobin trending down since admission at outside facility, 8.5 on day of transfer. Hg was trended daily, calin during admission was 7.6, and was improving at time of discharge. IV iron was administered on 4/16. Hg at time of discharge was 9.5.    Post operative pain: Secondary to her surgical procedure on [address] experienced significant postoperative pain complicated by worsening of her prolapse during.    Significant Results and Procedures   -Cardiac ECHO 4/11 - limited study but no visible vegetation or abnormalities    -ELEAZAR: 4/13 Diagnostic transesophageal echocardiography performed to rule out  endocarditis. No intracardiac masses or vegetations visualized. Normal cardiac anatomy. No atrial, ventricular or arterial level shunting. No pericardial effusion. Normal right and left ventricular size and systolic function. A catheter is seen with its tip at the junction of the superior vena cava and right atrium.    -Ostomy revision 4/13 - tolerated well    Immunization History   Immunization Status:  up to date and documented     Pending Results   None    Primary Care Physician   Morris Johns    Physical Exam   Vital Signs with Ranges  Temp:   [97.9  F (36.6  C)-98.3  F (36.8  C)] 97.9  F (36.6  C)  Heart Rate:  [134-160] 149  Resp:  [30-32] 30  BP: ()/(48-54) 107/52  SpO2:  [99 %] 99 %  I/O last 3 completed shifts:  In: 846.5 [NG/GT:3]  Out: 447 [Urine:343; Stool:104]    General: lying in bed playful, smiling and happy  HENT: Moist mucous membranes, no adenopathy  Eyes: Normal conjunctivae, EOMI  Neck/Lymph: Normal ROM.   Cardiovascular: Regular rate and rhythm.  Normal S1/S2, no murmurs.  Cap refill brisk  Respiratory: Normal effort.  Normal breath sounds bilaterally  Abdomen: Abdomen soft, non-distended. Surgical ostomy revision site with visible ostomy prolapse ~2inches, appears pink and well perfused. Ostomy bag in place with thickened bilious/yellow drainage noted. Central line catheter on place, horizontal incision scar CDI healing well, no erythema, CDI. G tube site intact, granulation tissue improved.  Musculoskeletal: No obvious deformities.  No peripheral edema.   Neurological: Awake, alert, interactive, moving all extremities, social smile.  Skin: Dry, intact.  Erythematous region in folds of skin near groin improved.    Discharge Disposition   Discharged to long-term care facility in [place]  Condition at discharge: Stable    Consultations This Hospital Stay   WOUND OSTOMY CONTINENCE NURSE  IP CONSULT  MUSIC THERAPY PEDS IP CONSULT   PHYSICAL THERAPY PEDS IP CONSULT  OCCUPATIONAL THERAPY PEDS IP CONSULT  PEDS PACCT (PAIN AND ADVANCED/COMPLEX CARE TEAM) IP CONSULT  PEDS PACCT (PAIN AND ADVANCED/COMPLEX CARE TEAM) IP CONSULT    Discharge Orders     Reason for your hospital stay   Washington was admitted for a central line infection and ostomy prolapse revision     Follow Up and recommended labs and tests   Follow up with PCP in [facility] regarding hospital stay in 1 week     Activity   Your activity upon discharge: activity as tolerated     When to contact your care team   New fevers, unable to tolerate feeds, ostomy changes such as  "increased prolapse, dusky coloring, decreased output     Discharge Instructions   Continue all home cares as prior to admission and discussed with home care nursing staff prior to discharge     Discharge Instructions   Ostomy care: Wash area around ostomy with soap and water as needed. Apply 3M NoSting on peristomal skin.   Assess integrity of ostomy pouch every 4 hours. Empty when 1/3 to 1/2 full. Change ostomy pouch every 3 days and prn if leaking. Clean peristomal skin with water and pat dry. If there are any open areas of skin, dust on a thin dusting of ostomy powder onto open areas and then dab with 3M NoSting barrier. Use [place] ostomy pouch 3795. Use about 1/3 of a [employer] 2\" ring ([employer] # [id number]) and form it to fill in the incision at the 3:00 position to make a flat pouching surface. Squeeze a ribbon of ostomy paste around the pouch opening before you place the pouch to her skin. Place your warm hand or a warm pack over the pouch for 3-4 minutes to help form seal to skin.   Place a package of 2x2 or 4x4 into the pouch to absorb the liquid stool. Weigh them to keep track of I and O.    Attempt to reduce stoma twice daily.     Full Code     Diet   Follow this diet upon discharge:  -Elecare 10ml/hr +2oz green beans - continuous  -Full TPN       Discharge Medications   Discharge Medication List as of 4/26/2018  9:54 AM      START taking these medications    Details   acetaminophen (TYLENOL) 32 mg/mL solution Take 4 mLs (128 mg) by mouth every 4 hours as needed for fever or mild pain, OTC      ibuprofen (ADVIL/MOTRIN) 100 MG/5ML suspension 4.5 mLs (90 mg) by Per G Tube route every 6 hours as needed for moderate pain, Disp-273 mL, R-0, E-Prescribe      nystatin (MYCOSTATIN) cream Apply topically dailyDisp-30 g, B-[id number]      sodium chloride, PF, 0.9% PF flush Inject 10 mLs into the vein every hour as needed for post meds or blood draw, Disp-1000 mL, R-0, Local Print         CONTINUE these " medications which have NOT CHANGED    Details   loperamide (IMODIUM A-D) 2 MG tablet Take 1 tablet (2 mg) by mouth every 6 hours Crush tablet and give in g-tube, No Print Out      OMEPRAZOLE PO 9 mg by Per G Tube route 2 times daily , Historical         STOP taking these medications       Acetaminophen (TYLENOL PO) Comments:   Reason for Stopping:         ethanol, 74%, PEDS/NICU 74 % injection Comments:   Reason for Stopping:             Allergies   Allergies   Allergen Reactions     Sugar-Protein-Starch [Nitebite]      Data   Most Recent 3 CBC's:  Recent Labs   Lab Test  04/23/18   0621  04/16/18   0614  04/15/18   0612   04/13/18   0655   WBC   --    --    --    --   12.5   HGB  9.5*  7.7*  7.6*   < >  9.7*   MCV   --    --    --    --   88   PLT   --    --    --    --   455*    < > = values in this interval not displayed.      Most Recent 3 BMP's:  Recent Labs   Lab Test  04/25/18   0551  04/23/18   0621  04/21/18   0554   04/20/18   0550   NA  137  139   --    --   143   POTASSIUM  4.4  4.4  4.0  4.0   < >  2.9*   CHLORIDE  105  107   --    --   115*   CO2  23  24   --    --   19   BUN  20*  21*   --    --   14   CR  0.17  0.15   --    --   0.17   ANIONGAP  9  8   --    --   9   GERRI  9.9  9.8   --    --   7.1*   GLC  84  80   --    --   71    < > = values in this interval not displayed.     Most Recent 2 LFT's:  Recent Labs   Lab Test  04/23/18   0621  04/16/18   0614   AST  124*  37   ALT  302*  187*   ALKPHOS  432*  281   BILITOTAL  0.4  0.4     Most Recent INR's and Anticoagulation Dosing History:  Anticoagulation Dose History     Recent Dosing and Labs Latest Ref Rng & Units 4/11/2018 4/13/2018 4/16/2018 4/23/2018    INR 0.86 - 1.14 1.25(H) 1.23(H) 1.22(H) 1.18(H)        Most Recent 3 Troponin's:No lab results found.  Most Recent Cholesterol Panel:No lab results found.  Most Recent 6 Bacteria Isolates From Any Culture (See EPIC Reports for Culture Details):  Recent Labs   Lab Test  04/13/18   0772   04/13/18   0700  04/13/18   0655  04/12/18   1014  04/12/18   0725  04/11/18   0805   CULT  No growth  No growth  No growth  No growth  No growth  No growth  No growth     Most Recent TSH, T4 and A1c Labs:No lab results found.  Results for orders placed or performed during the hospital encounter of 04/10/18   XR Chest Port 1 View    Narrative    XR CHEST PORT 1 VW  4/11/2018 5:03 PM      HISTORY: CVC tip line placement;     COMPARISON: None    FINDINGS:   Portable supine view of the chest. Central venous catheter tip  projects at the medial left brachiocephalic. The cardiac silhouette  size is normal. The lungs are clear. Percutaneous gastrostomy tube  projects over the stomach.      Impression    IMPRESSION:   Central venous catheter tip at the medial left brachiocephalic.    MELI COLINDRES MD

## 2024-05-16 NOTE — PATIENT INSTRUCTIONS
RTC with chemotherapy  Started addition concurrently with chemo soon as possible pending PET/CT  2D echo

## 2024-05-17 DIAGNOSIS — I42.9 CARDIOMYOPATHY, UNSPECIFIED TYPE (HCC): Primary | ICD-10-CM

## 2024-05-17 DIAGNOSIS — I11.9 BENIGN HYPERTENSIVE HEART DISEASE WITHOUT CHF: Primary | ICD-10-CM

## 2024-05-17 NOTE — TELEPHONE ENCOUNTER
Kristina from [facility] called office to let Nancy know that patient's blood has been sent to [facility].

## 2024-05-20 ENCOUNTER — TELEPHONE (OUTPATIENT)
Dept: ONCOLOGY | Age: 69
End: 2024-05-20

## 2024-05-20 DIAGNOSIS — C53.8 MALIGNANT NEOPLASM OF OVERLAPPING SITES OF CERVIX UTERI (HCC): Primary | ICD-10-CM

## 2024-05-20 RX ORDER — ONDANSETRON 8 MG/1
8 TABLET, ORALLY DISINTEGRATING ORAL EVERY 8 HOURS PRN
Qty: 60 TABLET | Refills: 0 | Status: SHIPPED | OUTPATIENT
Start: 2024-05-20

## 2024-05-20 RX ORDER — LIDOCAINE AND PRILOCAINE 25; 25 MG/G; MG/G
CREAM TOPICAL
Qty: 30 G | Refills: 1 | Status: SHIPPED | OUTPATIENT
Start: 2024-05-20

## 2024-05-20 NOTE — TELEPHONE ENCOUNTER
Spoke with Iggy to inform him that I tried to call [facility] however I had no answer to communicate with them RE upcoming appointments.  He is also agreeable to have MD visit here 5/30/24 at 1530 for follow up.  She will have PET this week and has met with radiation oncology in [place] and will also meet at [facility] next Wednesday.  Finally explained that at this time no plan to proceed with echo.

## 2024-05-20 NOTE — TELEPHONE ENCOUNTER
Call received from Farideh at [facility].  Pt is scheduled for PET scan on Friday 5/24/24.  Kristina HERNANDEZ RN notified.      Petrona Vincent RN

## 2024-05-22 ENCOUNTER — HOSPITAL ENCOUNTER (OUTPATIENT)
Dept: HOSPITAL 101 - LAB | Age: 69
Discharge: HOME | End: 2024-05-22
Payer: MEDICARE

## 2024-05-22 DIAGNOSIS — C53.8 MALIGNANT NEOPLASM OF OVERLAPPING SITES OF CERVIX UTERI (HCC): Primary | ICD-10-CM

## 2024-05-22 DIAGNOSIS — I11.0: Primary | ICD-10-CM

## 2024-05-22 LAB
ANION GAP: 16.2
BLOOD UREA NITROGEN: 16 MG/DL (ref 7–18)
CALCIUM: 9.6 MG/DL (ref 8.5–10.1)
CARBON DIOXIDE: 24.4 MMOL/L (ref 21–32)
CHLORIDE: 100 MMOL/L (ref 98–107)
ESTIMATED GFR (AFRICAN AMERICA: 45 (ref 60–?)
ESTIMATED GFR (NON-AFRICAN AME: 37 (ref 60–?)
GLUCOSE: 99 MG/DL (ref 74–106)
POTASSIUM: 4.6 MMOL/L (ref 3.5–5.1)
SODIUM: 136 MMOL/L (ref 136–145)

## 2024-05-22 PROCEDURE — 80048 BASIC METABOLIC PNL TOTAL CA: CPT

## 2024-05-22 PROCEDURE — 36415 COLL VENOUS BLD VENIPUNCTURE: CPT

## 2024-05-24 LAB
PD-L1 BY 22C3 TISS IMSTN DOC: POSITIVE
TEMPUS PD-L1 (22C3) COMBINED POSITIVE SCORE: 17
TEMPUS PD-L1 (22C3) TUMOR PROPORTION SCORE: 15 %
TEMPUS PORTAL: NORMAL

## 2024-05-26 LAB
DNA RANGE(S) EXAMINED NAR: NORMAL
GENE DIS ANL INTERP-IMP: POSITIVE
GENE DIS ASSESSED: NORMAL
MSI CA SPEC-IMP: NOT DETECTED
REASON FOR STUDY: NORMAL
TEMPUS LCA: NORMAL
TEMPUS PORTAL: NORMAL
TEMPUS THERAPY1: NORMAL
TEMPUS THERAPYCOUNT: 1
TEMPUS TRIALCOUNT: 3
TEMPUS TRIALMATCHES1: NORMAL
TEMPUS TRIALMATCHES2: NORMAL
TEMPUS TRIALMATCHES3: NORMAL

## 2024-05-28 LAB
DNA RANGE(S) EXAMINED NAR: NORMAL
GENE DIS ANL INTERP-IMP: POSITIVE
GENE DIS ASSESSED: NORMAL
GENE MUT TESTED BLD/T: 5.3 M/MB
MSI CA SPEC-IMP: NORMAL
PD-L1 BY 22C3 TISS IMSTN DOC: POSITIVE
REASON FOR STUDY: NORMAL
TEMPUS FUSIONADDENDUM: NORMAL
TEMPUS LCA: NORMAL
TEMPUS PD-L1 (22C3) COMBINED POSITIVE SCORE: 17
TEMPUS PD-L1 (22C3) TUMOR PROPORTION SCORE: 15 %
TEMPUS PORTAL: NORMAL
TEMPUS THERAPY1: NORMAL
TEMPUS THERAPYCOUNT: 1
TEMPUS TRIALCOUNT: 3
TEMPUS TRIALMATCHES1: NORMAL
TEMPUS TRIALMATCHES2: NORMAL
TEMPUS TRIALMATCHES3: NORMAL

## 2024-05-30 ENCOUNTER — OFFICE VISIT (OUTPATIENT)
Dept: ONCOLOGY | Age: 69
End: 2024-05-30
Payer: MEDICARE

## 2024-05-30 VITALS
DIASTOLIC BLOOD PRESSURE: 58 MMHG | TEMPERATURE: 98.3 F | SYSTOLIC BLOOD PRESSURE: 112 MMHG | HEART RATE: 76 BPM | BODY MASS INDEX: 31.93 KG/M2 | RESPIRATION RATE: 18 BRPM | WEIGHT: 186 LBS

## 2024-05-30 DIAGNOSIS — C53.8 MALIGNANT NEOPLASM OF OVERLAPPING SITES OF CERVIX UTERI (HCC): ICD-10-CM

## 2024-05-30 PROCEDURE — 1111F DSCHRG MED/CURRENT MED MERGE: CPT | Performed by: INTERNAL MEDICINE

## 2024-05-30 PROCEDURE — 99211 OFF/OP EST MAY X REQ PHY/QHP: CPT | Performed by: INTERNAL MEDICINE

## 2024-05-30 PROCEDURE — 1036F TOBACCO NON-USER: CPT | Performed by: INTERNAL MEDICINE

## 2024-05-30 PROCEDURE — 3017F COLORECTAL CA SCREEN DOC REV: CPT | Performed by: INTERNAL MEDICINE

## 2024-05-30 PROCEDURE — 1090F PRES/ABSN URINE INCON ASSESS: CPT | Performed by: INTERNAL MEDICINE

## 2024-05-30 PROCEDURE — 99214 OFFICE O/P EST MOD 30 MIN: CPT | Performed by: INTERNAL MEDICINE

## 2024-05-30 PROCEDURE — G8417 CALC BMI ABV UP PARAM F/U: HCPCS | Performed by: INTERNAL MEDICINE

## 2024-05-30 PROCEDURE — G8427 DOCREV CUR MEDS BY ELIG CLIN: HCPCS | Performed by: INTERNAL MEDICINE

## 2024-05-30 PROCEDURE — 1123F ACP DISCUSS/DSCN MKR DOCD: CPT | Performed by: INTERNAL MEDICINE

## 2024-05-30 PROCEDURE — G8400 PT W/DXA NO RESULTS DOC: HCPCS | Performed by: INTERNAL MEDICINE

## 2024-05-30 NOTE — PROGRESS NOTES
1.4 x 1.2 cm.  Smaller right  external iliac lymph nodes.    Visualized gastrointestinal tract and osseous structures are unremarkable.  Minimal free fluid.  Impression: 1. A 5.9 x 3.8 cm enhancing mass within the cervix extending into the lower  uterine segment is highly suspicious for cervical cancer and there is  extension into the bilateral parametria with apparent invasion of the bladder  wall and suspected mild extension into the upper vagina.  2. Uterus appears to be septate and both endometrium moieties are distended  with complicated fluid, likely due to obstruction from the cervical mass.  3. Dilated right distal ureter may be due to obstruction from bladder wall  implant.  4. External iliac lymphadenopathy, left greater than right, is likely  metastatic.  CT HEAD WO CONTRAST  Narrative: EXAMINATION:  CT OF THE HEAD WITHOUT CONTRAST  5/2/2024 10:15 am    TECHNIQUE:  CT of the head was performed without the administration of intravenous  contrast. Automated exposure control, iterative reconstruction, and/or weight  based adjustment of the mA/kV was utilized to reduce the radiation dose to as  low as reasonably achievable.    COMPARISON:  None.    HISTORY:  ORDERING SYSTEM PROVIDED HISTORY: concern for history of stroke, altered  mental status  TECHNOLOGIST PROVIDED HISTORY:    concern for history of stroke, altered mental status    FINDINGS:  BRAIN/VENTRICLES: There is no acute intracranial hemorrhage, mass effect or  midline shift.  No abnormal extra-axial fluid collection.  The gray-white  differentiation is maintained without evidence of an acute infarct.  There is  no evidence of hydrocephalus.    Mild chronic microvascular disease is identified periventricular white matter    ORBITS: The visualized portion of the orbits demonstrate no acute abnormality.    SINUSES: The visualized paranasal sinuses and mastoid air cells demonstrate  no acute abnormality.    SOFT TISSUES/SKULL:  No acute abnormality of

## 2024-06-01 ENCOUNTER — HOSPITAL ENCOUNTER (OUTPATIENT)
Dept: HOSPITAL 101 - ER | Age: 69
Setting detail: OBSERVATION
LOS: 1 days | Discharge: NURSING, REHAB OR LONG-TERM CARE | End: 2024-06-02
Payer: MEDICARE

## 2024-06-01 VITALS — HEART RATE: 71 BPM | OXYGEN SATURATION: 99 %

## 2024-06-01 VITALS — SYSTOLIC BLOOD PRESSURE: 134 MMHG | OXYGEN SATURATION: 98 % | HEART RATE: 70 BPM | DIASTOLIC BLOOD PRESSURE: 69 MMHG

## 2024-06-01 VITALS
OXYGEN SATURATION: 97 % | SYSTOLIC BLOOD PRESSURE: 109 MMHG | HEART RATE: 68 BPM | TEMPERATURE: 98.06 F | DIASTOLIC BLOOD PRESSURE: 55 MMHG

## 2024-06-01 VITALS — BODY MASS INDEX: 32.7 KG/M2 | BODY MASS INDEX: 31.9 KG/M2

## 2024-06-01 VITALS — HEART RATE: 78 BPM | OXYGEN SATURATION: 98 % | DIASTOLIC BLOOD PRESSURE: 56 MMHG | SYSTOLIC BLOOD PRESSURE: 93 MMHG

## 2024-06-01 VITALS — OXYGEN SATURATION: 96 % | DIASTOLIC BLOOD PRESSURE: 69 MMHG | HEART RATE: 68 BPM | SYSTOLIC BLOOD PRESSURE: 134 MMHG

## 2024-06-01 VITALS — OXYGEN SATURATION: 99 %

## 2024-06-01 VITALS
HEART RATE: 75 BPM | TEMPERATURE: 97.52 F | OXYGEN SATURATION: 80 % | DIASTOLIC BLOOD PRESSURE: 51 MMHG | SYSTOLIC BLOOD PRESSURE: 121 MMHG

## 2024-06-01 VITALS
OXYGEN SATURATION: 99 % | TEMPERATURE: 96.98 F | SYSTOLIC BLOOD PRESSURE: 121 MMHG | DIASTOLIC BLOOD PRESSURE: 51 MMHG | HEART RATE: 71 BPM

## 2024-06-01 DIAGNOSIS — D50.0: ICD-10-CM

## 2024-06-01 DIAGNOSIS — I11.0: ICD-10-CM

## 2024-06-01 DIAGNOSIS — C53.9: ICD-10-CM

## 2024-06-01 DIAGNOSIS — H91.93: ICD-10-CM

## 2024-06-01 DIAGNOSIS — I50.32: ICD-10-CM

## 2024-06-01 DIAGNOSIS — I25.10: ICD-10-CM

## 2024-06-01 DIAGNOSIS — E78.00: ICD-10-CM

## 2024-06-01 DIAGNOSIS — E11.69: ICD-10-CM

## 2024-06-01 DIAGNOSIS — R55: Primary | ICD-10-CM

## 2024-06-01 DIAGNOSIS — N95.0: ICD-10-CM

## 2024-06-01 DIAGNOSIS — Z79.899: ICD-10-CM

## 2024-06-01 LAB
ADD MANUAL DIFF: NO
ALANINE AMINOTRANSFERASE: 19 U/L (ref 14–59)
ALBUMIN GLOBULIN RATIO: 1
ALBUMIN LEVEL: 3 G/DL (ref 3.4–5)
ALKALINE PHOSPHATASE: 105 U/L (ref 46–116)
ANION GAP: 15.3
ASPARTATE AMINO TRANSFERASE: 13 U/L (ref 15–37)
BLOOD UREA NITROGEN: 29 MG/DL (ref 7–18)
CALCIUM: 8.7 MG/DL (ref 8.5–10.1)
CARBON DIOXIDE: 21.8 MMOL/L (ref 21–32)
CHLORIDE: 99 MMOL/L (ref 98–107)
ESTIMATED GFR (AFRICAN AMERICA: 46 (ref 60–?)
ESTIMATED GFR (NON-AFRICAN AME: 38 (ref 60–?)
GLOBULIN: 3 G/DL
GLUCOSE: 110 MG/DL (ref 74–106)
HEMATOCRIT: 29.5 % (ref 36–48)
HEMOGLOBIN: 8.8 G/DL (ref 12–16)
IMMATURE GRANULOCYTES ABS AUTO: 0.04 10^3/UL (ref 0–0.03)
IMMATURE GRANULOCYTES PCT AUTO: 0.5 % (ref 0–0.5)
INR: 0.98
LYMPHOCYTES  ABSOLUTE AUTO: 1.1 10^3/UL (ref 1.2–3.8)
MAGNESIUM: 2 MG/DL (ref 1.8–2.4)
MCV RBC: 91.3 FL (ref 81–99)
MEAN CORPUSCULAR HEMOGLOBIN: 27.2 PG (ref 26.7–34)
MEAN CORPUSCULAR HGB CONC: 29.8 G/DL (ref 29.9–35.2)
PARTIAL THROMBOPLASTIN TIME: 25.9 SEC (ref 22.3–36.2)
PLATELET COUNT: 334 10^3/UL (ref 150–450)
POTASSIUM: 4.1 MMOL/L (ref 3.5–5.1)
PROTHROMBIN TIME: 10.4 SEC (ref 9–11.6)
RED BLOOD COUNT: 3.23 10^6/UL (ref 4.2–5.4)
SODIUM: 132 MMOL/L (ref 136–145)
TOTAL PROTEIN: 6 G/DL (ref 6.4–8.2)
WHITE BLOOD COUNT: 7.5 10^3/UL (ref 4–11)

## 2024-06-01 PROCEDURE — 94761 N-INVAS EAR/PLS OXIMETRY MLT: CPT

## 2024-06-01 PROCEDURE — 86850 RBC ANTIBODY SCREEN: CPT

## 2024-06-01 PROCEDURE — 99285 EMERGENCY DEPT VISIT HI MDM: CPT

## 2024-06-01 PROCEDURE — 71045 X-RAY EXAM CHEST 1 VIEW: CPT

## 2024-06-01 PROCEDURE — 85025 COMPLETE CBC W/AUTO DIFF WBC: CPT

## 2024-06-01 PROCEDURE — 80048 BASIC METABOLIC PNL TOTAL CA: CPT

## 2024-06-01 PROCEDURE — 86901 BLOOD TYPING SEROLOGIC RH(D): CPT

## 2024-06-01 PROCEDURE — 84484 ASSAY OF TROPONIN QUANT: CPT

## 2024-06-01 PROCEDURE — 86900 BLOOD TYPING SEROLOGIC ABO: CPT

## 2024-06-01 PROCEDURE — 85378 FIBRIN DEGRADE SEMIQUANT: CPT

## 2024-06-01 PROCEDURE — 80053 COMPREHEN METABOLIC PANEL: CPT

## 2024-06-01 PROCEDURE — 85730 THROMBOPLASTIN TIME PARTIAL: CPT

## 2024-06-01 PROCEDURE — 96372 THER/PROPH/DIAG INJ SC/IM: CPT

## 2024-06-01 PROCEDURE — G0378 HOSPITAL OBSERVATION PER HR: HCPCS

## 2024-06-01 PROCEDURE — 96360 HYDRATION IV INFUSION INIT: CPT

## 2024-06-01 PROCEDURE — 83735 ASSAY OF MAGNESIUM: CPT

## 2024-06-01 PROCEDURE — 93005 ELECTROCARDIOGRAM TRACING: CPT

## 2024-06-01 PROCEDURE — 71275 CT ANGIOGRAPHY CHEST: CPT

## 2024-06-01 PROCEDURE — 85610 PROTHROMBIN TIME: CPT

## 2024-06-01 PROCEDURE — 36415 COLL VENOUS BLD VENIPUNCTURE: CPT

## 2024-06-01 PROCEDURE — 96361 HYDRATE IV INFUSION ADD-ON: CPT

## 2024-06-01 RX ADMIN — SODIUM CHLORIDE 999 ML: 900 INJECTION, SOLUTION INTRAVENOUS at 20:31

## 2024-06-02 VITALS — HEART RATE: 65 BPM

## 2024-06-02 VITALS — HEART RATE: 67 BPM | DIASTOLIC BLOOD PRESSURE: 62 MMHG | OXYGEN SATURATION: 99 % | SYSTOLIC BLOOD PRESSURE: 113 MMHG

## 2024-06-02 VITALS — HEART RATE: 65 BPM | DIASTOLIC BLOOD PRESSURE: 58 MMHG | SYSTOLIC BLOOD PRESSURE: 105 MMHG | OXYGEN SATURATION: 98 %

## 2024-06-02 VITALS — SYSTOLIC BLOOD PRESSURE: 112 MMHG | DIASTOLIC BLOOD PRESSURE: 66 MMHG | HEART RATE: 72 BPM

## 2024-06-02 VITALS — HEART RATE: 80 BPM | OXYGEN SATURATION: 71 % | DIASTOLIC BLOOD PRESSURE: 60 MMHG | SYSTOLIC BLOOD PRESSURE: 119 MMHG

## 2024-06-02 VITALS — HEART RATE: 74 BPM

## 2024-06-02 VITALS — HEART RATE: 60 BPM

## 2024-06-02 VITALS — HEART RATE: 67 BPM | OXYGEN SATURATION: 99 %

## 2024-06-02 VITALS — HEART RATE: 62 BPM

## 2024-06-02 VITALS — HEART RATE: 58 BPM

## 2024-06-02 VITALS — HEART RATE: 66 BPM

## 2024-06-02 VITALS — HEART RATE: 79 BPM

## 2024-06-02 VITALS — HEART RATE: 67 BPM

## 2024-06-02 VITALS — HEART RATE: 68 BPM

## 2024-06-02 VITALS — OXYGEN SATURATION: 98 % | HEART RATE: 72 BPM

## 2024-06-02 VITALS — HEART RATE: 63 BPM

## 2024-06-02 VITALS — HEART RATE: 72 BPM

## 2024-06-02 LAB
ANION GAP: 9.5
BLOOD UREA NITROGEN: 21 MG/DL (ref 7–18)
CALCIUM: 8.7 MG/DL (ref 8.5–10.1)
CARBON DIOXIDE: 26.8 MMOL/L (ref 21–32)
CHLORIDE: 104 MMOL/L (ref 98–107)
ESTIMATED GFR (AFRICAN AMERICA: 55 (ref 60–?)
ESTIMATED GFR (NON-AFRICAN AME: 46 (ref 60–?)
GLUCOSE: 84 MG/DL (ref 74–106)
MAGNESIUM: 1.9 MG/DL (ref 1.8–2.4)
POTASSIUM: 4.3 MMOL/L (ref 3.5–5.1)
SODIUM: 136 MMOL/L (ref 136–145)

## 2024-06-02 RX ADMIN — ENOXAPARIN SODIUM 40 MG: 40 INJECTION SUBCUTANEOUS at 08:18

## 2024-06-02 RX ADMIN — SODIUM CHLORIDE 100 ML: 900 INJECTION, SOLUTION INTRAVENOUS at 05:44

## 2024-06-03 ENCOUNTER — TELEPHONE (OUTPATIENT)
Dept: ONCOLOGY | Age: 69
End: 2024-06-03

## 2024-06-03 NOTE — TELEPHONE ENCOUNTER
Received call from Iggy stating that decision has been for [facility] to not have treatment but to go with hospice care instead.    Will cancel port placement appointment for 6//12/24.

## 2024-06-07 ENCOUNTER — TELEPHONE (OUTPATIENT)
Dept: GYNECOLOGIC ONCOLOGY | Age: 69
End: 2024-06-07

## 2024-06-07 NOTE — TELEPHONE ENCOUNTER
Son called and requested Dr. Rocha's opinion on care plan recommendations per medical oncology.  Son informed Dr. Rocha out of town until next week, but would be made aware of request. Son voiced understanding and appreciation.

## 2024-06-07 NOTE — TELEPHONE ENCOUNTER
Called and informed son that writer spoke with Dr. Rocha and that care options and recommendations by Dr. Villegas was a collaborative decision between Dr. Villegas and Dr. Rocha.  Son voiced understanding and solace in the fact that the care team was all in agreement.  Son then informed writer that patient is bleeding and is going through incontinence supplies quickly.  Son is asking if there is a medication that is available to assist with this.       Writer spoke with surgeon and was informed that palliative radiation would be the best option, but not necessarily a permanent solution to bleeding.  Described that bleeding is from tumor and an unfortunate part of the disease process.   Son Iggy updated on information.  Iggy inquired as to how palliative radiation would work.  Writer informed that a referral would be placed and appointment would be scheduled.  Iggy voiced understanding.  Writer informed that facility care coordinator placed hospice referral and Iggy to note options.  Writer encouraged son to contact this office or nurse navigator if radiation oncology referral preferred.  Son voiced understanding and appreciation.

## 2024-06-20 ENCOUNTER — TELEPHONE (OUTPATIENT)
Dept: ONCOLOGY | Age: 69
End: 2024-06-20

## 2024-06-20 NOTE — TELEPHONE ENCOUNTER
Name: Zaira Huizar  : 1955  MRN: [id number]    Oncology Navigation Follow-Up Note    Contact Type:  Telephone    Notes: Call made to Zaira's POAIggy.  No answer.  Left message informing Iggy that writer was calling to follow up and make sure that Zaira has everything she needs.  Encouraged Iggy to call back with any questions or needs.    Prior call to office notes decision for hospice services. Call received from Amarilis at Dr. Rcoha's office regarding possible palliative radiation.  No call was received for referral.  Call was made in follow up.      Electronically signed by Petrona Vincent RN on 2024 at 10:17 AM

## 2024-06-25 ENCOUNTER — HOSPITAL ENCOUNTER (INPATIENT)
Dept: HOSPITAL 101 - ER | Age: 69
LOS: 3 days | Discharge: HOME | DRG: 872 | End: 2024-06-28
Payer: MEDICARE

## 2024-06-25 VITALS — BODY MASS INDEX: 32.8 KG/M2 | BODY MASS INDEX: 68.1 KG/M2 | BODY MASS INDEX: 1 KG/M2

## 2024-06-25 VITALS
TEMPERATURE: 97.6 F | OXYGEN SATURATION: 95 % | DIASTOLIC BLOOD PRESSURE: 70 MMHG | HEART RATE: 65 BPM | SYSTOLIC BLOOD PRESSURE: 112 MMHG

## 2024-06-25 VITALS
TEMPERATURE: 97.8 F | DIASTOLIC BLOOD PRESSURE: 66 MMHG | SYSTOLIC BLOOD PRESSURE: 93 MMHG | OXYGEN SATURATION: 94 % | HEART RATE: 67 BPM

## 2024-06-25 VITALS
DIASTOLIC BLOOD PRESSURE: 66 MMHG | OXYGEN SATURATION: 96 % | SYSTOLIC BLOOD PRESSURE: 132 MMHG | HEART RATE: 64 BPM | TEMPERATURE: 98.5 F

## 2024-06-25 VITALS — SYSTOLIC BLOOD PRESSURE: 107 MMHG | TEMPERATURE: 97.88 F | HEART RATE: 63 BPM | DIASTOLIC BLOOD PRESSURE: 59 MMHG

## 2024-06-25 VITALS
HEART RATE: 67 BPM | SYSTOLIC BLOOD PRESSURE: 108 MMHG | TEMPERATURE: 97.88 F | DIASTOLIC BLOOD PRESSURE: 72 MMHG | OXYGEN SATURATION: 95 %

## 2024-06-25 VITALS — HEART RATE: 73 BPM | OXYGEN SATURATION: 96 %

## 2024-06-25 VITALS
OXYGEN SATURATION: 95 % | SYSTOLIC BLOOD PRESSURE: 108 MMHG | HEART RATE: 67 BPM | TEMPERATURE: 97.88 F | DIASTOLIC BLOOD PRESSURE: 72 MMHG

## 2024-06-25 VITALS — DIASTOLIC BLOOD PRESSURE: 60 MMHG | SYSTOLIC BLOOD PRESSURE: 109 MMHG | HEART RATE: 60 BPM | TEMPERATURE: 97.9 F

## 2024-06-25 VITALS
TEMPERATURE: 97.8 F | DIASTOLIC BLOOD PRESSURE: 62 MMHG | HEART RATE: 67 BPM | OXYGEN SATURATION: 96 % | SYSTOLIC BLOOD PRESSURE: 126 MMHG

## 2024-06-25 VITALS — OXYGEN SATURATION: 96 %

## 2024-06-25 VITALS — SYSTOLIC BLOOD PRESSURE: 126 MMHG | DIASTOLIC BLOOD PRESSURE: 62 MMHG | TEMPERATURE: 97.52 F | HEART RATE: 69 BPM

## 2024-06-25 VITALS — HEART RATE: 75 BPM | OXYGEN SATURATION: 97 %

## 2024-06-25 VITALS
SYSTOLIC BLOOD PRESSURE: 108 MMHG | DIASTOLIC BLOOD PRESSURE: 72 MMHG | OXYGEN SATURATION: 95 % | HEART RATE: 69 BPM | TEMPERATURE: 98 F

## 2024-06-25 VITALS — DIASTOLIC BLOOD PRESSURE: 47 MMHG | HEART RATE: 67 BPM | TEMPERATURE: 97.9 F | SYSTOLIC BLOOD PRESSURE: 108 MMHG

## 2024-06-25 VITALS
TEMPERATURE: 98 F | HEART RATE: 69 BPM | DIASTOLIC BLOOD PRESSURE: 72 MMHG | SYSTOLIC BLOOD PRESSURE: 108 MMHG | OXYGEN SATURATION: 95 %

## 2024-06-25 VITALS — HEART RATE: 72 BPM

## 2024-06-25 VITALS — HEART RATE: 54 BPM

## 2024-06-25 DIAGNOSIS — Z90.49: ICD-10-CM

## 2024-06-25 DIAGNOSIS — D50.9: ICD-10-CM

## 2024-06-25 DIAGNOSIS — C53.9: ICD-10-CM

## 2024-06-25 DIAGNOSIS — N13.6: ICD-10-CM

## 2024-06-25 DIAGNOSIS — Z79.82: ICD-10-CM

## 2024-06-25 DIAGNOSIS — F41.1: ICD-10-CM

## 2024-06-25 DIAGNOSIS — E78.00: ICD-10-CM

## 2024-06-25 DIAGNOSIS — Z98.891: ICD-10-CM

## 2024-06-25 DIAGNOSIS — H91.93: ICD-10-CM

## 2024-06-25 DIAGNOSIS — E86.0: ICD-10-CM

## 2024-06-25 DIAGNOSIS — J44.9: ICD-10-CM

## 2024-06-25 DIAGNOSIS — D62: ICD-10-CM

## 2024-06-25 DIAGNOSIS — K21.9: ICD-10-CM

## 2024-06-25 DIAGNOSIS — F20.9: ICD-10-CM

## 2024-06-25 DIAGNOSIS — E11.9: ICD-10-CM

## 2024-06-25 DIAGNOSIS — Z66: ICD-10-CM

## 2024-06-25 DIAGNOSIS — Z79.899: ICD-10-CM

## 2024-06-25 DIAGNOSIS — N17.9: ICD-10-CM

## 2024-06-25 DIAGNOSIS — A41.9: Primary | ICD-10-CM

## 2024-06-25 DIAGNOSIS — Z79.84: ICD-10-CM

## 2024-06-25 LAB
ADD MANUAL DIFF: NO
ALANINE AMINOTRANSFERASE: 17 U/L (ref 14–59)
ALBUMIN GLOBULIN RATIO: 1
ALBUMIN LEVEL: 2.9 G/DL (ref 3.4–5)
ALKALINE PHOSPHATASE: 82 U/L (ref 46–116)
ANION GAP: 11.1
ASPARTATE AMINO TRANSFERASE: 13 U/L (ref 15–37)
BLOOD UREA NITROGEN: 20 MG/DL (ref 7–18)
CALCIUM: 8.6 MG/DL (ref 8.5–10.1)
CARBON DIOXIDE: 26.4 MMOL/L (ref 21–32)
CHLORIDE: 103 MMOL/L (ref 98–107)
ESTIMATED GFR (AFRICAN AMERICA: 34 (ref 60–?)
ESTIMATED GFR (NON-AFRICAN AME: 28 (ref 60–?)
GLOBULIN: 2.8 G/DL
GLUCOSE: 91 MG/DL (ref 74–106)
HEMATOCRIT: 23.5 % (ref 36–48)
HEMATOCRIT: 31.1 % (ref 36–48)
HEMOGLOBIN: 6.9 G/DL (ref 12–16)
HEMOGLOBIN: 9.4 G/DL (ref 12–16)
IMMATURE GRANULOCYTES ABS AUTO: 0.04 10^3/UL (ref 0–0.03)
IMMATURE GRANULOCYTES PCT AUTO: 0.5 % (ref 0–0.5)
LACTATE/LACTIC ACID: 2.1 MMOL/L (ref 0.4–2)
LACTATE/LACTIC ACID: 2.6 MMOL/L (ref 0.4–2)
LYMPHOCYTES  ABSOLUTE AUTO: 1.3 10^3/UL (ref 1.2–3.8)
MAGNESIUM: 2 MG/DL (ref 1.8–2.4)
MCV RBC: 92.9 FL (ref 81–99)
MEAN CORPUSCULAR HEMOGLOBIN: 27.3 PG (ref 26.7–34)
MEAN CORPUSCULAR HGB CONC: 29.4 G/DL (ref 29.9–35.2)
PLATELET COUNT: 362 10^3/UL (ref 150–450)
POTASSIUM: 4.5 MMOL/L (ref 3.5–5.1)
RED BLOOD COUNT: 2.53 10^6/UL (ref 4.2–5.4)
SODIUM: 136 MMOL/L (ref 136–145)
TOTAL PROTEIN: 5.7 G/DL (ref 6.4–8.2)
WHITE BLOOD COUNT: 7.9 10^3/UL (ref 4–11)

## 2024-06-25 PROCEDURE — 85018 HEMOGLOBIN: CPT

## 2024-06-25 PROCEDURE — 87086 URINE CULTURE/COLONY COUNT: CPT

## 2024-06-25 PROCEDURE — 94640 AIRWAY INHALATION TREATMENT: CPT

## 2024-06-25 PROCEDURE — 81001 URINALYSIS AUTO W/SCOPE: CPT

## 2024-06-25 PROCEDURE — 36430 TRANSFUSION BLD/BLD COMPNT: CPT

## 2024-06-25 PROCEDURE — 74176 CT ABD & PELVIS W/O CONTRAST: CPT

## 2024-06-25 PROCEDURE — 96365 THER/PROPH/DIAG IV INF INIT: CPT

## 2024-06-25 PROCEDURE — 85025 COMPLETE CBC W/AUTO DIFF WBC: CPT

## 2024-06-25 PROCEDURE — P9016 RBC LEUKOCYTES REDUCED: HCPCS

## 2024-06-25 PROCEDURE — 94761 N-INVAS EAR/PLS OXIMETRY MLT: CPT

## 2024-06-25 PROCEDURE — 96376 TX/PRO/DX INJ SAME DRUG ADON: CPT

## 2024-06-25 PROCEDURE — 36415 COLL VENOUS BLD VENIPUNCTURE: CPT

## 2024-06-25 PROCEDURE — 99285 EMERGENCY DEPT VISIT HI MDM: CPT

## 2024-06-25 PROCEDURE — 97165 OT EVAL LOW COMPLEX 30 MIN: CPT

## 2024-06-25 PROCEDURE — 86901 BLOOD TYPING SEROLOGIC RH(D): CPT

## 2024-06-25 PROCEDURE — 86900 BLOOD TYPING SEROLOGIC ABO: CPT

## 2024-06-25 PROCEDURE — 83735 ASSAY OF MAGNESIUM: CPT

## 2024-06-25 PROCEDURE — 86850 RBC ANTIBODY SCREEN: CPT

## 2024-06-25 PROCEDURE — 97530 THERAPEUTIC ACTIVITIES: CPT

## 2024-06-25 PROCEDURE — 96375 TX/PRO/DX INJ NEW DRUG ADDON: CPT

## 2024-06-25 PROCEDURE — 85014 HEMATOCRIT: CPT

## 2024-06-25 PROCEDURE — 96367 TX/PROPH/DG ADDL SEQ IV INF: CPT

## 2024-06-25 PROCEDURE — 80053 COMPREHEN METABOLIC PANEL: CPT

## 2024-06-25 PROCEDURE — 97162 PT EVAL MOD COMPLEX 30 MIN: CPT

## 2024-06-25 PROCEDURE — 82948 REAGENT STRIP/BLOOD GLUCOSE: CPT

## 2024-06-25 PROCEDURE — 96366 THER/PROPH/DIAG IV INF ADDON: CPT

## 2024-06-25 PROCEDURE — G0328 FECAL BLOOD SCRN IMMUNOASSAY: HCPCS

## 2024-06-25 PROCEDURE — 83605 ASSAY OF LACTIC ACID: CPT

## 2024-06-25 PROCEDURE — 96368 THER/DIAG CONCURRENT INF: CPT

## 2024-06-25 RX ADMIN — TOPIRAMATE 25 MG: 25 TABLET, FILM COATED ORAL at 21:27

## 2024-06-25 RX ADMIN — RANOLAZINE 500 MG: 500 TABLET, EXTENDED RELEASE ORAL at 21:26

## 2024-06-25 RX ADMIN — CIPROFLOXACIN 200 MG: 2 INJECTION, SOLUTION INTRAVENOUS at 21:27

## 2024-06-25 RX ADMIN — TRAZODONE HYDROCHLORIDE 25 MG: 50 TABLET ORAL at 21:27

## 2024-06-25 RX ADMIN — PANTOPRAZOLE SODIUM 40 MG: 40 INJECTION, POWDER, FOR SOLUTION INTRAVENOUS at 21:27

## 2024-06-25 RX ADMIN — ACETAMINOPHEN 1000 MG: 500 TABLET ORAL at 23:13

## 2024-06-25 RX ADMIN — IPRATROPIUM BROMIDE AND ALBUTEROL SULFATE 3 ML: .5; 2.5 SOLUTION RESPIRATORY (INHALATION) at 23:33

## 2024-06-25 RX ADMIN — ATORVASTATIN CALCIUM 40 MG: 40 TABLET, FILM COATED ORAL at 21:27

## 2024-06-26 VITALS — HEART RATE: 61 BPM

## 2024-06-26 VITALS
DIASTOLIC BLOOD PRESSURE: 75 MMHG | HEART RATE: 72 BPM | OXYGEN SATURATION: 97 % | SYSTOLIC BLOOD PRESSURE: 131 MMHG | TEMPERATURE: 98.42 F

## 2024-06-26 VITALS — HEART RATE: 74 BPM | OXYGEN SATURATION: 98 %

## 2024-06-26 VITALS — HEART RATE: 77 BPM | OXYGEN SATURATION: 95 %

## 2024-06-26 VITALS
OXYGEN SATURATION: 94 % | SYSTOLIC BLOOD PRESSURE: 108 MMHG | HEART RATE: 71 BPM | TEMPERATURE: 98.1 F | DIASTOLIC BLOOD PRESSURE: 62 MMHG

## 2024-06-26 VITALS — HEART RATE: 66 BPM

## 2024-06-26 VITALS — HEART RATE: 65 BPM

## 2024-06-26 VITALS
DIASTOLIC BLOOD PRESSURE: 70 MMHG | SYSTOLIC BLOOD PRESSURE: 108 MMHG | TEMPERATURE: 97.8 F | HEART RATE: 77 BPM | OXYGEN SATURATION: 91 %

## 2024-06-26 VITALS — HEART RATE: 77 BPM

## 2024-06-26 VITALS
HEART RATE: 73 BPM | SYSTOLIC BLOOD PRESSURE: 112 MMHG | DIASTOLIC BLOOD PRESSURE: 71 MMHG | OXYGEN SATURATION: 94 % | TEMPERATURE: 97.7 F

## 2024-06-26 VITALS — HEART RATE: 68 BPM | OXYGEN SATURATION: 99 %

## 2024-06-26 VITALS — HEART RATE: 81 BPM

## 2024-06-26 VITALS — HEART RATE: 83 BPM

## 2024-06-26 VITALS
OXYGEN SATURATION: 93 % | SYSTOLIC BLOOD PRESSURE: 99 MMHG | HEART RATE: 70 BPM | DIASTOLIC BLOOD PRESSURE: 64 MMHG | TEMPERATURE: 97.88 F

## 2024-06-26 VITALS — HEART RATE: 78 BPM

## 2024-06-26 VITALS — HEART RATE: 71 BPM

## 2024-06-26 VITALS — HEART RATE: 84 BPM

## 2024-06-26 VITALS
DIASTOLIC BLOOD PRESSURE: 73 MMHG | TEMPERATURE: 98 F | SYSTOLIC BLOOD PRESSURE: 118 MMHG | OXYGEN SATURATION: 93 % | HEART RATE: 78 BPM

## 2024-06-26 VITALS — HEART RATE: 85 BPM

## 2024-06-26 VITALS — OXYGEN SATURATION: 98 % | HEART RATE: 69 BPM

## 2024-06-26 VITALS — HEART RATE: 67 BPM

## 2024-06-26 LAB
ADD MANUAL DIFF: NO
ALANINE AMINOTRANSFERASE: 17 U/L (ref 14–59)
ALBUMIN GLOBULIN RATIO: 1
ALBUMIN LEVEL: 2.7 G/DL (ref 3.4–5)
ALKALINE PHOSPHATASE: 78 U/L (ref 46–116)
ANION GAP: 12.4
ASPARTATE AMINO TRANSFERASE: 12 U/L (ref 15–37)
BLOOD UREA NITROGEN: 22 MG/DL (ref 7–18)
CALCIUM: 8.4 MG/DL (ref 8.5–10.1)
CARBON DIOXIDE: 24.9 MMOL/L (ref 21–32)
CAST SEEN?: (no result) #/LPF
CHLORIDE: 107 MMOL/L (ref 98–107)
ESTIMATED GFR (AFRICAN AMERICA: 46 (ref 60–?)
ESTIMATED GFR (NON-AFRICAN AME: 38 (ref 60–?)
GLOBULIN: 2.7 G/DL
GLUCOSE URINE UA: NEGATIVE MG/DL
GLUCOSE: 107 MG/DL (ref 74–106)
HEMATOCRIT: 29.7 % (ref 36–48)
HEMOGLOBIN: 9 G/DL (ref 12–16)
IMMATURE GRANULOCYTES ABS AUTO: 0.04 10^3/UL (ref 0–0.03)
IMMATURE GRANULOCYTES PCT AUTO: 0.4 % (ref 0–0.5)
LYMPHOCYTES  ABSOLUTE AUTO: 0.4 10^3/UL (ref 1.2–3.8)
MCV RBC: 90 FL (ref 81–99)
MEAN CORPUSCULAR HEMOGLOBIN: 27.3 PG (ref 26.7–34)
MEAN CORPUSCULAR HGB CONC: 30.3 G/DL (ref 29.9–35.2)
PLATELET COUNT: 333 10^3/UL (ref 150–450)
POTASSIUM: 4.3 MMOL/L (ref 3.5–5.1)
RED BLOOD COUNT: 3.3 10^6/UL (ref 4.2–5.4)
SODIUM: 140 MMOL/L (ref 136–145)
TOTAL PROTEIN: 5.4 G/DL (ref 6.4–8.2)
URINE CULTURE INDICATED: (no result)
WHITE BLOOD COUNT: 10.3 10^3/UL (ref 4–11)

## 2024-06-26 RX ADMIN — METFORMIN HCL 1000 MG: 500 TABLET ORAL at 21:47

## 2024-06-26 RX ADMIN — FOLIC ACID-PYRIDOXINE-CYANOCOBALAMIN TAB 2.5-25-2 MG 1 TAB: 2.5-25-2 TAB at 09:39

## 2024-06-26 RX ADMIN — IPRATROPIUM BROMIDE AND ALBUTEROL SULFATE 3 ML: .5; 2.5 SOLUTION RESPIRATORY (INHALATION) at 05:00

## 2024-06-26 RX ADMIN — ACARBOSE 100 MG: 50 TABLET ORAL at 09:38

## 2024-06-26 RX ADMIN — CIPROFLOXACIN 200 MG: 2 INJECTION, SOLUTION INTRAVENOUS at 09:37

## 2024-06-26 RX ADMIN — TOPIRAMATE 25 MG: 25 TABLET, FILM COATED ORAL at 21:47

## 2024-06-26 RX ADMIN — MONTELUKAST 10 MG: 10 TABLET, FILM COATED ORAL at 09:39

## 2024-06-26 RX ADMIN — METFORMIN HCL 1000 MG: 500 TABLET ORAL at 09:39

## 2024-06-26 RX ADMIN — ACARBOSE 100 MG: 50 TABLET ORAL at 16:39

## 2024-06-26 RX ADMIN — CIPROFLOXACIN 200 MG: 2 INJECTION, SOLUTION INTRAVENOUS at 21:47

## 2024-06-26 RX ADMIN — ATORVASTATIN CALCIUM 40 MG: 40 TABLET, FILM COATED ORAL at 21:47

## 2024-06-26 RX ADMIN — IPRATROPIUM BROMIDE AND ALBUTEROL SULFATE 3 ML: .5; 2.5 SOLUTION RESPIRATORY (INHALATION) at 16:00

## 2024-06-26 RX ADMIN — RANOLAZINE 500 MG: 500 TABLET, EXTENDED RELEASE ORAL at 21:47

## 2024-06-26 RX ADMIN — IPRATROPIUM BROMIDE AND ALBUTEROL SULFATE 3 ML: .5; 2.5 SOLUTION RESPIRATORY (INHALATION) at 10:31

## 2024-06-26 RX ADMIN — IPRATROPIUM BROMIDE AND ALBUTEROL SULFATE 3 ML: .5; 2.5 SOLUTION RESPIRATORY (INHALATION) at 23:06

## 2024-06-26 RX ADMIN — TRAZODONE HYDROCHLORIDE 25 MG: 50 TABLET ORAL at 21:47

## 2024-06-26 RX ADMIN — ACETAMINOPHEN 1000 MG: 500 TABLET ORAL at 16:38

## 2024-06-26 RX ADMIN — RANOLAZINE 500 MG: 500 TABLET, EXTENDED RELEASE ORAL at 09:38

## 2024-06-26 RX ADMIN — SACUBITRIL AND VALSARTAN 1 TAB: 24; 26 TABLET, FILM COATED ORAL at 21:47

## 2024-06-26 RX ADMIN — PANTOPRAZOLE SODIUM 40 MG: 40 INJECTION, POWDER, FOR SOLUTION INTRAVENOUS at 21:47

## 2024-06-26 RX ADMIN — SACUBITRIL AND VALSARTAN 1 TAB: 24; 26 TABLET, FILM COATED ORAL at 09:39

## 2024-06-26 RX ADMIN — CHOLECALCIFEROL TAB 125 MCG (5000 UNIT) 125 MCG: 125 TAB at 09:38

## 2024-06-27 VITALS — HEART RATE: 75 BPM

## 2024-06-27 VITALS — HEART RATE: 72 BPM | OXYGEN SATURATION: 97 %

## 2024-06-27 VITALS — HEART RATE: 73 BPM | OXYGEN SATURATION: 98 %

## 2024-06-27 VITALS
OXYGEN SATURATION: 95 % | TEMPERATURE: 97.7 F | DIASTOLIC BLOOD PRESSURE: 67 MMHG | HEART RATE: 79 BPM | SYSTOLIC BLOOD PRESSURE: 104 MMHG

## 2024-06-27 VITALS — HEART RATE: 72 BPM

## 2024-06-27 VITALS
OXYGEN SATURATION: 94 % | SYSTOLIC BLOOD PRESSURE: 113 MMHG | DIASTOLIC BLOOD PRESSURE: 70 MMHG | HEART RATE: 67 BPM | TEMPERATURE: 97.88 F

## 2024-06-27 VITALS
TEMPERATURE: 97.52 F | HEART RATE: 69 BPM | DIASTOLIC BLOOD PRESSURE: 74 MMHG | OXYGEN SATURATION: 95 % | SYSTOLIC BLOOD PRESSURE: 128 MMHG

## 2024-06-27 VITALS — HEART RATE: 76 BPM

## 2024-06-27 VITALS — HEART RATE: 77 BPM

## 2024-06-27 VITALS — HEART RATE: 82 BPM

## 2024-06-27 VITALS
HEART RATE: 62 BPM | DIASTOLIC BLOOD PRESSURE: 57 MMHG | OXYGEN SATURATION: 94 % | TEMPERATURE: 98.8 F | SYSTOLIC BLOOD PRESSURE: 103 MMHG

## 2024-06-27 VITALS — HEART RATE: 62 BPM

## 2024-06-27 VITALS — HEART RATE: 64 BPM

## 2024-06-27 VITALS — HEART RATE: 70 BPM

## 2024-06-27 VITALS — OXYGEN SATURATION: 93 %

## 2024-06-27 VITALS — OXYGEN SATURATION: 99 %

## 2024-06-27 VITALS — HEART RATE: 67 BPM

## 2024-06-27 LAB
ADD MANUAL DIFF: NO
ALANINE AMINOTRANSFERASE: 17 U/L (ref 14–59)
ALBUMIN GLOBULIN RATIO: 1
ALBUMIN LEVEL: 2.7 G/DL (ref 3.4–5)
ALKALINE PHOSPHATASE: 78 U/L (ref 46–116)
ANION GAP: 14.3
ASPARTATE AMINO TRANSFERASE: 15 U/L (ref 15–37)
BLOOD UREA NITROGEN: 16 MG/DL (ref 7–18)
CALCIUM: 8.6 MG/DL (ref 8.5–10.1)
CARBON DIOXIDE: 22.6 MMOL/L (ref 21–32)
CHLORIDE: 109 MMOL/L (ref 98–107)
ESTIMATED GFR (AFRICAN AMERICA: 55 (ref 60–?)
ESTIMATED GFR (NON-AFRICAN AME: 45 (ref 60–?)
GLOBULIN: 2.6 G/DL
GLUCOSE: 95 MG/DL (ref 74–106)
HEMATOCRIT: 28.1 % (ref 36–48)
HEMOGLOBIN: 8.7 G/DL (ref 12–16)
IMMATURE GRANULOCYTES ABS AUTO: 0.03 10^3/UL (ref 0–0.03)
IMMATURE GRANULOCYTES PCT AUTO: 0.5 % (ref 0–0.5)
LYMPHOCYTES  ABSOLUTE AUTO: 1 10^3/UL (ref 1.2–3.8)
MCV RBC: 88.9 FL (ref 81–99)
MEAN CORPUSCULAR HEMOGLOBIN: 27.5 PG (ref 26.7–34)
MEAN CORPUSCULAR HGB CONC: 31 G/DL (ref 29.9–35.2)
PLATELET COUNT: 318 10^3/UL (ref 150–450)
POTASSIUM: 3.9 MMOL/L (ref 3.5–5.1)
RED BLOOD COUNT: 3.16 10^6/UL (ref 4.2–5.4)
SODIUM: 142 MMOL/L (ref 136–145)
TOTAL PROTEIN: 5.3 G/DL (ref 6.4–8.2)
WHITE BLOOD COUNT: 5.5 10^3/UL (ref 4–11)

## 2024-06-27 RX ADMIN — SACUBITRIL AND VALSARTAN 1 TAB: 24; 26 TABLET, FILM COATED ORAL at 16:13

## 2024-06-27 RX ADMIN — ACARBOSE 100 MG: 50 TABLET ORAL at 16:11

## 2024-06-27 RX ADMIN — IPRATROPIUM BROMIDE AND ALBUTEROL SULFATE 3 ML: .5; 2.5 SOLUTION RESPIRATORY (INHALATION) at 04:28

## 2024-06-27 RX ADMIN — ACETAMINOPHEN 1000 MG: 500 TABLET ORAL at 16:12

## 2024-06-27 RX ADMIN — PANTOPRAZOLE SODIUM 40 MG: 40 INJECTION, POWDER, FOR SOLUTION INTRAVENOUS at 21:59

## 2024-06-27 RX ADMIN — IPRATROPIUM BROMIDE AND ALBUTEROL SULFATE 3 ML: .5; 2.5 SOLUTION RESPIRATORY (INHALATION) at 11:33

## 2024-06-27 RX ADMIN — CIPROFLOXACIN 200 MG: 2 INJECTION, SOLUTION INTRAVENOUS at 09:20

## 2024-06-27 RX ADMIN — IPRATROPIUM BROMIDE AND ALBUTEROL SULFATE 3 ML: .5; 2.5 SOLUTION RESPIRATORY (INHALATION) at 16:49

## 2024-06-27 RX ADMIN — CIPROFLOXACIN 200 MG: 2 INJECTION, SOLUTION INTRAVENOUS at 23:21

## 2024-06-27 RX ADMIN — IPRATROPIUM BROMIDE AND ALBUTEROL SULFATE 3 ML: .5; 2.5 SOLUTION RESPIRATORY (INHALATION) at 22:49

## 2024-06-28 ENCOUNTER — TELEPHONE (OUTPATIENT)
Dept: GYNECOLOGIC ONCOLOGY | Age: 69
End: 2024-06-28

## 2024-06-28 VITALS — OXYGEN SATURATION: 97 % | HEART RATE: 64 BPM

## 2024-06-28 VITALS
TEMPERATURE: 97.9 F | OXYGEN SATURATION: 93 % | HEART RATE: 68 BPM | DIASTOLIC BLOOD PRESSURE: 72 MMHG | SYSTOLIC BLOOD PRESSURE: 125 MMHG

## 2024-06-28 VITALS — HEART RATE: 79 BPM

## 2024-06-28 VITALS — OXYGEN SATURATION: 99 %

## 2024-06-28 VITALS
TEMPERATURE: 98 F | OXYGEN SATURATION: 97 % | DIASTOLIC BLOOD PRESSURE: 72 MMHG | HEART RATE: 75 BPM | SYSTOLIC BLOOD PRESSURE: 111 MMHG

## 2024-06-28 VITALS — HEART RATE: 66 BPM

## 2024-06-28 VITALS — HEART RATE: 67 BPM

## 2024-06-28 VITALS
DIASTOLIC BLOOD PRESSURE: 84 MMHG | SYSTOLIC BLOOD PRESSURE: 131 MMHG | OXYGEN SATURATION: 96 % | HEART RATE: 64 BPM | TEMPERATURE: 97.88 F

## 2024-06-28 VITALS — HEART RATE: 71 BPM

## 2024-06-28 RX ADMIN — CHOLECALCIFEROL TAB 125 MCG (5000 UNIT) 125 MCG: 125 TAB at 10:12

## 2024-06-28 RX ADMIN — MONTELUKAST 10 MG: 10 TABLET, FILM COATED ORAL at 10:13

## 2024-06-28 RX ADMIN — METFORMIN HCL 1000 MG: 500 TABLET ORAL at 10:12

## 2024-06-28 RX ADMIN — SACUBITRIL AND VALSARTAN 1 TAB: 24; 26 TABLET, FILM COATED ORAL at 10:12

## 2024-06-28 RX ADMIN — FOLIC ACID-PYRIDOXINE-CYANOCOBALAMIN TAB 2.5-25-2 MG 1 TAB: 2.5-25-2 TAB at 10:12

## 2024-06-28 RX ADMIN — ACETAMINOPHEN 1000 MG: 500 TABLET ORAL at 07:47

## 2024-06-28 RX ADMIN — RANOLAZINE 500 MG: 500 TABLET, EXTENDED RELEASE ORAL at 10:12

## 2024-06-28 RX ADMIN — IPRATROPIUM BROMIDE AND ALBUTEROL SULFATE 3 ML: .5; 2.5 SOLUTION RESPIRATORY (INHALATION) at 11:36

## 2024-06-28 RX ADMIN — CEFDINIR 600 MG: 300 CAPSULE ORAL at 10:12

## 2024-06-28 RX ADMIN — IPRATROPIUM BROMIDE AND ALBUTEROL SULFATE 3 ML: .5; 2.5 SOLUTION RESPIRATORY (INHALATION) at 04:44

## 2024-06-28 RX ADMIN — ACARBOSE 100 MG: 50 TABLET ORAL at 10:15

## 2024-06-28 NOTE — TELEPHONE ENCOUNTER
Called and spoke to Erika at Hospice. I told her that this patient has not been seen in our clinic, so we do not have a progress note from any visits. I let her know that we have several telephone encounters and an op note. I also told her we could write a letter, if needed. Erika told me that it was ok and that Providers name was given as someone patient has seen at some point. She does not need a letter. Erika voiced understanding and thanked writer.

## 2024-06-28 NOTE — TELEPHONE ENCOUNTER
Received call from [facility] she states son Iggy has requested Hospice care for patient. She is asking that recent progress notes be faxed that show patient decline to qualify for hospice care.Fax [phone].She states that she has already requested from .

## 2024-07-08 ENCOUNTER — HOSPITAL ENCOUNTER (EMERGENCY)
Dept: HOSPITAL 101 - ER | Age: 69
Discharge: LEFT BEFORE DISCHARGE | End: 2024-07-08
Payer: MEDICARE

## 2024-07-08 VITALS
OXYGEN SATURATION: 97 % | DIASTOLIC BLOOD PRESSURE: 87 MMHG | SYSTOLIC BLOOD PRESSURE: 148 MMHG | HEART RATE: 79 BPM | TEMPERATURE: 98.42 F

## 2024-07-08 DIAGNOSIS — Z53.21: Primary | ICD-10-CM

## 2024-07-17 ENCOUNTER — HOSPITAL ENCOUNTER (OUTPATIENT)
Dept: HOSPITAL 101 - LAB | Age: 69
Discharge: HOME | End: 2024-07-17
Payer: MEDICARE

## 2024-07-17 ENCOUNTER — HOSPITAL ENCOUNTER (EMERGENCY)
Age: 69
Discharge: HOME | End: 2024-07-17
Payer: MEDICARE

## 2024-07-17 VITALS
TEMPERATURE: 98.2 F | OXYGEN SATURATION: 98 % | SYSTOLIC BLOOD PRESSURE: 155 MMHG | BODY MASS INDEX: 32.6 KG/M2 | DIASTOLIC BLOOD PRESSURE: 69 MMHG | HEART RATE: 62 BPM

## 2024-07-17 DIAGNOSIS — S29.9XXA: ICD-10-CM

## 2024-07-17 DIAGNOSIS — H91.93: ICD-10-CM

## 2024-07-17 DIAGNOSIS — S30.0XXA: ICD-10-CM

## 2024-07-17 DIAGNOSIS — S00.03XA: Primary | ICD-10-CM

## 2024-07-17 DIAGNOSIS — Z66: ICD-10-CM

## 2024-07-17 DIAGNOSIS — S16.1XXA: ICD-10-CM

## 2024-07-17 DIAGNOSIS — Z79.82: ICD-10-CM

## 2024-07-17 DIAGNOSIS — D64.9: ICD-10-CM
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
## 2024-07-17 DIAGNOSIS — D64.9: Primary | ICD-10-CM

## 2024-07-17 LAB
ADD MANUAL DIFF: YES
BASOPHILS ABS MANUAL: 0 10^3/UL (ref 0–0.1)
BASOPHILS NFR SPEC MANUAL: 0 % (ref 0.2–2)
BURR CELLS: (no result)
EOSINOPHILS ABSOLUTE MANUAL: 0.06 10^3/UL (ref 0–0.7)
EOSINOPHILS PERCENT MANUAL: 1 % (ref 0.9–7)
HCT VFR BLD CALC: 25.1 % (ref 36–48)
HEMATOCRIT: 25.1 % (ref 36–48)
HEMOGLOBIN: 7.8 G/DL (ref 12–16)
LYMPHOCYTES ABSOLUTE MANUAL: 0.91 10^3/UL (ref 1.2–3.8)
LYMPHOCYTES PERCENT MANUAL: 15 % (ref 20.5–60)
MCV RBC: 87.2 FL (ref 81–99)
MEAN CORPUSCULAR HEMOGLOBIN: 27.1 PG (ref 26.7–34)
MEAN CORPUSCULAR HGB CONC: 31.1 G/DL (ref 29.9–35.2)
MEAN CORPUSCULAR VOLUME: 87.2 FL (ref 81–99)
MONOCYTES ABSOLUTE MANUAL: 0.3 10^3/UL (ref 0.3–0.8)
MONOCYTES PERCENT MANUAL: 5 % (ref 1.7–12)
PLATELET # BLD: 389 10^3/UL (ref 150–450)
PLATELET COUNT: 389 10^3/UL (ref 150–450)
POIKILOCYTOSIS: (no result)
RED BLOOD COUNT: 2.88 10^6/UL (ref 4.2–5.4)
SEGMENTED NEUT ABSOLUTE MANUAL: 4.81 10^3/UL (ref 1.4–6.5)
SEGMENTED NEUTROPHILS % MANUAL: 79
WBC # BLD: 6.1 10^3/UL (ref 4–11)
WHITE BLOOD COUNT: 6.1 10^3/UL (ref 4–11)

## 2024-07-17 PROCEDURE — 72170 X-RAY EXAM OF PELVIS: CPT

## 2024-07-17 PROCEDURE — 72125 CT NECK SPINE W/O DYE: CPT

## 2024-07-17 PROCEDURE — 70450 CT HEAD/BRAIN W/O DYE: CPT

## 2024-07-17 PROCEDURE — 85027 COMPLETE CBC AUTOMATED: CPT

## 2024-07-17 PROCEDURE — 85007 BL SMEAR W/DIFF WBC COUNT: CPT

## 2024-07-17 PROCEDURE — 71045 X-RAY EXAM CHEST 1 VIEW: CPT

## 2024-07-17 PROCEDURE — 36415 COLL VENOUS BLD VENIPUNCTURE: CPT

## 2024-07-17 PROCEDURE — 99284 EMERGENCY DEPT VISIT MOD MDM: CPT

## 2024-07-19 ENCOUNTER — HOSPITAL ENCOUNTER (OUTPATIENT)
Dept: HOSPITAL 101 - LAB | Age: 69
Discharge: HOME | End: 2024-07-19
Payer: MEDICARE

## 2024-07-19 DIAGNOSIS — D64.9: Primary | ICD-10-CM

## 2024-07-19 LAB — GLUCOSE BLD-MCNC: 96 MG/DL (ref 74–106)

## 2024-07-19 PROCEDURE — 82947 ASSAY GLUCOSE BLOOD QUANT: CPT

## 2024-07-19 PROCEDURE — 36415 COLL VENOUS BLD VENIPUNCTURE: CPT

## 2024-07-20 ENCOUNTER — HOSPITAL ENCOUNTER (EMERGENCY)
Age: 69
End: 2024-07-20
Payer: MEDICARE

## 2024-07-20 VITALS — BODY MASS INDEX: 28.1 KG/M2

## 2024-07-20 VITALS — OXYGEN SATURATION: 98 %

## 2024-07-20 VITALS
DIASTOLIC BLOOD PRESSURE: 60 MMHG | OXYGEN SATURATION: 96 % | TEMPERATURE: 96 F | SYSTOLIC BLOOD PRESSURE: 84 MMHG | HEART RATE: 42 BPM

## 2024-07-20 VITALS — OXYGEN SATURATION: 88 %

## 2024-07-20 VITALS — OXYGEN SATURATION: 99 % | HEART RATE: 37 BPM

## 2024-07-20 VITALS — OXYGEN SATURATION: 94 %

## 2024-07-20 DIAGNOSIS — C53.9: ICD-10-CM

## 2024-07-20 DIAGNOSIS — Z66: ICD-10-CM

## 2024-07-20 DIAGNOSIS — R00.1: Primary | ICD-10-CM

## 2024-07-20 PROCEDURE — 96374 THER/PROPH/DIAG INJ IV PUSH: CPT

## 2024-07-20 PROCEDURE — 36415 COLL VENOUS BLD VENIPUNCTURE: CPT

## 2024-07-20 PROCEDURE — 80053 COMPREHEN METABOLIC PANEL: CPT

## 2024-07-20 PROCEDURE — 99284 EMERGENCY DEPT VISIT MOD MDM: CPT

## (undated) DEVICE — NEPTUNE E-SEP 165MM SUCTION SLEEVE: Brand: NEPTUNE E-SEP

## (undated) DEVICE — NEPTUNE E-SEP SMOKE EVACUATION PENCIL, COATED, 70MM BLADE, PUSH BUTTON SWITCH: Brand: NEPTUNE E-SEP

## (undated) DEVICE — 1016 S-DRAPE IRRIG POUCH 10/BOX: Brand: STERI-DRAPE™

## (undated) DEVICE — ELECTRODE ES L11CM DIA5MM BALL SHFT RED DISP UTAHLOOP

## (undated) DEVICE — STRAP ARMBRD W1.5XL32IN FOAM STR YET SFT W/ HK AND LOOP

## (undated) DEVICE — COVER OR TBL W40XL90IN ABSRB STD AND GRIPPY BK SAHARA

## (undated) DEVICE — SOCK SPEC SHT 4.5 IN UNIV CANSTR COMPATIBILITY

## (undated) DEVICE — SHEET,DRAPE,70X100,STERILE: Brand: MEDLINE

## (undated) DEVICE — DRAPE,UNDRBUT,WHT GRAD PCH,CAPPORT,20/CS: Brand: MEDLINE

## (undated) DEVICE — SVMMC GYN MIN PK

## (undated) DEVICE — MARKER,SKIN,WI/RULER AND LABELS: Brand: MEDLINE

## (undated) DEVICE — SOLUTION SCRB 4OZ 4% CHG H2O AIDED FOR PREOPERATIVE SKIN

## (undated) DEVICE — GLOVE SURG SZ 55 CRM LTX FREE POLYISOPRENE POLYMER BEAD ANTI

## (undated) DEVICE — SOLUTION IV 1000ML 0.9% SOD CHL PH 5 INJ USP VIAFLX PLAS

## (undated) DEVICE — HYSTEROSCOPE RIGID CORAL AVETA DISP

## (undated) DEVICE — GLOVE SURG SZ 65 L12IN FNGR THK79MIL GRN LTX FREE

## (undated) DEVICE — BLADE ES L6IN ELASTOMERIC COAT EXT DURABLE BEND UPTO 90DEG

## (undated) DEVICE — STRAP,POSITIONING,KNEE/BODY,FOAM,4X60": Brand: MEDLINE

## (undated) DEVICE — CYSTO/BLADDER IRRIGATION SET, REGULATING CLAMP

## (undated) DEVICE — SYSTEM WST MGMT AVETA

## (undated) DEVICE — GOWN,AURORA,NONREINFORCED,LARGE: Brand: MEDLINE

## (undated) DEVICE — SYSTEM WST MGMT W/ CAP AVETA

## (undated) DEVICE — ELECTRODE PT RET AD L9FT HI MOIST COND ADH HYDRGEL CORDED